# Patient Record
Sex: FEMALE | Race: WHITE | Employment: STUDENT | URBAN - METROPOLITAN AREA
[De-identification: names, ages, dates, MRNs, and addresses within clinical notes are randomized per-mention and may not be internally consistent; named-entity substitution may affect disease eponyms.]

---
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
## 2021-01-05 ENCOUNTER — LAB ENCOUNTER (OUTPATIENT)
Dept: LAB | Facility: HOSPITAL | Age: 21
End: 2021-01-05
Attending: FAMILY MEDICINE
Payer: COMMERCIAL

## 2021-01-05 DIAGNOSIS — N92.0 MENORRHAGIA WITH REGULAR CYCLE: ICD-10-CM

## 2021-01-05 DIAGNOSIS — R42 DIZZINESS: ICD-10-CM

## 2021-01-05 LAB
ALBUMIN SERPL-MCNC: 4.2 G/DL (ref 3.4–5)
ALBUMIN/GLOB SERPL: 1.5 {RATIO} (ref 1–2)
ALP LIVER SERPL-CCNC: 56 U/L
ALT SERPL-CCNC: 18 U/L
ANION GAP SERPL CALC-SCNC: 8 MMOL/L (ref 0–18)
AST SERPL-CCNC: 11 U/L (ref 15–37)
BILIRUB SERPL-MCNC: 0.5 MG/DL (ref 0.1–2)
BUN BLD-MCNC: 8 MG/DL (ref 7–18)
BUN/CREAT SERPL: 12.7 (ref 10–20)
CALCIUM BLD-MCNC: 9.2 MG/DL (ref 8.5–10.1)
CHLORIDE SERPL-SCNC: 105 MMOL/L (ref 98–112)
CO2 SERPL-SCNC: 25 MMOL/L (ref 21–32)
CREAT BLD-MCNC: 0.63 MG/DL
DEPRECATED HBV CORE AB SER IA-ACNC: 19.7 NG/ML
DEPRECATED RDW RBC AUTO: 39.8 FL (ref 35.1–46.3)
ERYTHROCYTE [DISTWIDTH] IN BLOOD BY AUTOMATED COUNT: 12 % (ref 11–15)
GLOBULIN PLAS-MCNC: 2.8 G/DL (ref 2.8–4.4)
GLUCOSE BLD-MCNC: 94 MG/DL (ref 70–99)
HCT VFR BLD AUTO: 38.2 %
HGB BLD-MCNC: 13.1 G/DL
IRON SATURATION: 26 %
IRON SERPL-MCNC: 105 UG/DL
M PROTEIN MFR SERPL ELPH: 7 G/DL (ref 6.4–8.2)
MCH RBC QN AUTO: 31 PG (ref 26–34)
MCHC RBC AUTO-ENTMCNC: 34.3 G/DL (ref 31–37)
MCV RBC AUTO: 90.5 FL
OSMOLALITY SERPL CALC.SUM OF ELEC: 284 MOSM/KG (ref 275–295)
PATIENT FASTING Y/N/NP: NO
PLATELET # BLD AUTO: 209 10(3)UL (ref 150–450)
POTASSIUM SERPL-SCNC: 4 MMOL/L (ref 3.5–5.1)
RBC # BLD AUTO: 4.22 X10(6)UL
SODIUM SERPL-SCNC: 138 MMOL/L (ref 136–145)
TOTAL IRON BINDING CAPACITY: 401 UG/DL (ref 240–450)
TRANSFERRIN SERPL-MCNC: 269 MG/DL (ref 200–360)
TSI SER-ACNC: 1.08 MIU/ML (ref 0.36–3.74)
VIT B12 SERPL-MCNC: 337 PG/ML (ref 193–986)
VIT D+METAB SERPL-MCNC: 28.6 NG/ML (ref 30–100)
WBC # BLD AUTO: 7 X10(3) UL (ref 4–11)

## 2021-01-05 PROCEDURE — 36415 COLL VENOUS BLD VENIPUNCTURE: CPT

## 2021-01-05 PROCEDURE — 83540 ASSAY OF IRON: CPT

## 2021-01-05 PROCEDURE — 83550 IRON BINDING TEST: CPT

## 2021-01-05 PROCEDURE — 82306 VITAMIN D 25 HYDROXY: CPT

## 2021-01-05 PROCEDURE — 80053 COMPREHEN METABOLIC PANEL: CPT

## 2021-01-05 PROCEDURE — 82728 ASSAY OF FERRITIN: CPT

## 2021-01-05 PROCEDURE — 84443 ASSAY THYROID STIM HORMONE: CPT

## 2021-01-05 PROCEDURE — 82607 VITAMIN B-12: CPT

## 2021-01-05 PROCEDURE — 85027 COMPLETE CBC AUTOMATED: CPT

## 2021-01-07 ENCOUNTER — TELEPHONE (OUTPATIENT)
Dept: FAMILY MEDICINE CLINIC | Facility: CLINIC | Age: 21
End: 2021-01-07

## 2021-01-07 NOTE — TELEPHONE ENCOUNTER
Pt's mother is calling they are anxious to hear the results for the labs and to make sure before she goes back to school she has everything she needs please call.

## 2021-04-02 NOTE — PROGRESS NOTES
VIDEO VISIT  This visit is conducted using Telemedicine with live, interactive video and audio. Patient has been referred to the [facility] website at [email or web address] to review the yearly Consent to Treat document.     Patient understands and accepts Social History    Tobacco Use      Smoking status: Never Smoker      Smokeless tobacco: Never Used    Vaping Use      Vaping Use: Never used    Alcohol use: No    Drug use: No       Current Outpatient Medications   Medication Sig Dispense Refill   • FLUo

## 2021-04-24 DIAGNOSIS — F41.1 GAD (GENERALIZED ANXIETY DISORDER): ICD-10-CM

## 2021-04-24 DIAGNOSIS — F41.0 PANIC DISORDER: ICD-10-CM

## 2021-04-26 RX ORDER — FLUOXETINE 10 MG/1
TABLET, FILM COATED ORAL
Qty: 30 TABLET | Refills: 1 | OUTPATIENT
Start: 2021-04-26

## 2021-05-21 ENCOUNTER — HOSPITAL ENCOUNTER (OUTPATIENT)
Age: 21
Discharge: HOME | End: 2021-05-21
Attending: EMERGENCY MEDICINE
Payer: COMMERCIAL

## 2021-05-21 VITALS
SYSTOLIC BLOOD PRESSURE: 105 MMHG | RESPIRATION RATE: 16 BRPM | DIASTOLIC BLOOD PRESSURE: 60 MMHG | HEART RATE: 68 BPM | TEMPERATURE: 99 F

## 2021-05-21 DIAGNOSIS — S61.210A LACERATION OF RIGHT INDEX FINGER WITHOUT FOREIGN BODY WITHOUT DAMAGE TO NAIL, INITIAL ENCOUNTER: Primary | ICD-10-CM

## 2021-05-21 PROCEDURE — 99213 OFFICE O/P EST LOW 20 MIN: CPT

## 2021-05-21 PROCEDURE — 12001 RPR S/N/AX/GEN/TRNK 2.5CM/<: CPT

## 2021-05-21 PROCEDURE — 99202 OFFICE O/P NEW SF 15 MIN: CPT

## 2021-05-22 NOTE — ED INITIAL ASSESSMENT (HPI)
Cut right hand on broken dish yesterday. States she continues to have bleeding with movement of hand. + distal CMS.

## 2021-05-22 NOTE — ED PROVIDER NOTES
Patient Seen in: [facility]      History   Patient presents with:  Laceration/Abrasion    Stated Complaint: cut on hand between fingers    HPI/Subjective:   HPI    The patient is a 41-year-old female with no significant past medical history p upper and lower extremities bilaterally  Extremities: No focal swelling or tenderness  Skin: There is a 1.5 cm full-thickness laceration on the dorsal aspect of the right hand overlying the right second MCP joint.       ED Course   Labs Reviewed - No data t

## 2021-05-31 ENCOUNTER — HOSPITAL ENCOUNTER (OUTPATIENT)
Age: 21
Discharge: HOME | End: 2021-05-31
Payer: COMMERCIAL

## 2021-05-31 VITALS
RESPIRATION RATE: 18 BRPM | HEART RATE: 78 BPM | DIASTOLIC BLOOD PRESSURE: 70 MMHG | BODY MASS INDEX: 19 KG/M2 | SYSTOLIC BLOOD PRESSURE: 124 MMHG | OXYGEN SATURATION: 99 % | WEIGHT: 110 LBS | TEMPERATURE: 98 F

## 2021-05-31 DIAGNOSIS — Z48.02 ENCOUNTER FOR REMOVAL OF SUTURES: Primary | ICD-10-CM

## 2021-05-31 NOTE — ED INITIAL ASSESSMENT (HPI)
PATIENT ARRIVED AMBULATORY TO ROOM WITH FATHER FOR SUTURE REMOVAL. PATIENT WAS IN THE IC ON 5/21 AND HAD ONE SUTURE PLACED TO THE RIGHT HAND. HERE FOR SUTURE REMOVAL. NO REDNESS/SWELLING TO SITE.

## 2021-05-31 NOTE — ED PROVIDER NOTES
Patient presents with:  Suture Removal      HPI:     Savita Stoddard is a 24year old female presents for suture removal removal. Injury occurred 9 days ago. The patient denies wound problems or concerns.      Family History   Problem Relation Age of Onset Transportation (Medical):       Lack of Transportation (Non-Medical):   Physical Activity:       Days of Exercise per Week:       Minutes of Exercise per Session:   Stress:       Feeling of Stress :   Social Connections:       Frequency of Communication wi • FASTING 01/05/2021 No    • WBC 01/05/2021 7.0    • RBC 01/05/2021 4.22    • HGB 01/05/2021 13.1    • HCT 01/05/2021 38.2    • PLT 01/05/2021 209.0    • MCV 01/05/2021 90.5    • MCH 01/05/2021 31.0    • MCHC 01/05/2021 34.3    • RDW 01/05/2021 12.0    •

## 2021-09-20 ENCOUNTER — TELEPHONE (OUTPATIENT)
Dept: FAMILY MEDICINE CLINIC | Facility: CLINIC | Age: 21
End: 2021-09-20

## 2021-09-20 DIAGNOSIS — F41.0 PANIC DISORDER: ICD-10-CM

## 2021-09-20 DIAGNOSIS — F41.1 GAD (GENERALIZED ANXIETY DISORDER): ICD-10-CM

## 2021-09-20 NOTE — TELEPHONE ENCOUNTER
Refill request for:    Requested Prescriptions      No prescriptions requested or ordered in this encounter        Last Prescribed Quantity Refills   4/2/2021 30 1   Telemedicine on 4/21/2021     Patient was asked to follow-up in: Follow-up timeframe not d

## 2021-09-21 DIAGNOSIS — F41.0 PANIC DISORDER: ICD-10-CM

## 2021-09-21 DIAGNOSIS — F41.1 GAD (GENERALIZED ANXIETY DISORDER): ICD-10-CM

## 2021-09-21 RX ORDER — FLUOXETINE 10 MG/1
TABLET, FILM COATED ORAL
Qty: 30 TABLET | Refills: 1 | Status: SHIPPED | OUTPATIENT
Start: 2021-09-21

## 2021-09-21 NOTE — TELEPHONE ENCOUNTER
Refill request for:    Requested Prescriptions     Pending Prescriptions Disp Refills   • FLUOXETINE 10 MG Oral Tab [Pharmacy Med Name: FLUOXETINE HCL 10 MG TABLET] 30 tablet 1     Sig: TAKE 1 TABLET BY MOUTH EVERY DAY        Last Prescribed Quantity Refil

## 2021-09-23 NOTE — TELEPHONE ENCOUNTER
Called patient and left message on machine to contact office and schedule follow up appointment, let her know refill was sent

## 2021-10-26 ENCOUNTER — TELEMEDICINE (OUTPATIENT)
Dept: FAMILY MEDICINE CLINIC | Facility: CLINIC | Age: 21
End: 2021-10-26

## 2021-10-26 DIAGNOSIS — N92.1 BREAKTHROUGH BLEEDING ON OCPS: Primary | ICD-10-CM

## 2021-10-26 PROCEDURE — 99213 OFFICE O/P EST LOW 20 MIN: CPT | Performed by: FAMILY MEDICINE

## 2021-10-26 NOTE — PROGRESS NOTES
VIDEO VISIT  This visit is conducted using Telemedicine with live, interactive video and audio. Patient has been referred to the [facility] website at [email or web address] to review the yearly Consent to Treat document.     Patient understands and accepts Brother    • Heart Disease Paternal Uncle       Social History    Tobacco Use      Smoking status: Never Smoker      Smokeless tobacco: Never Used    Vaping Use      Vaping Use: Never used    Alcohol use: No    Drug use: No       Current Outpatient Medicat

## 2021-11-13 DIAGNOSIS — N92.0 MENORRHAGIA WITH REGULAR CYCLE: ICD-10-CM

## 2021-11-18 RX ORDER — LEVONORGESTREL AND ETHINYL ESTRADIOL 0.1-0.02MG
KIT ORAL
Qty: 84 TABLET | Refills: 3 | Status: SHIPPED | OUTPATIENT
Start: 2021-11-18

## 2021-11-18 NOTE — TELEPHONE ENCOUNTER
Requested Prescriptions     Pending Prescriptions Disp Refills   • LARISSIA 0.1-20 MG-MCG Oral Tab [Pharmacy Med Name: LARISSIA-28 TABLET] 84 tablet 3     Sig: TAKE 1 TABLET BY MOUTH EVERY DAY     LOV 12/15/2020         Appointment scheduled: Visit date no

## 2022-04-14 ENCOUNTER — TELEPHONE (OUTPATIENT)
Dept: FAMILY MEDICINE CLINIC | Facility: CLINIC | Age: 22
End: 2022-04-14

## 2022-04-14 NOTE — TELEPHONE ENCOUNTER
Patient is calling to confirm the dosage of the Fluoxetine. On 9/21/21 it was prescribed as 1 tablet and she said she was taking 1/2 tablet can a nurse confirm the dosage.

## 2022-04-14 NOTE — TELEPHONE ENCOUNTER
Patient states she has been taking half tab of Fluoxetine, but chart indicates one tab. No notes indicating dosage change. Pt reports she feels fine on current dose. Will continue this and plan for f/u in office soon to discuss. She verbalized understanding and agrees with plan.

## 2022-08-08 ENCOUNTER — TELEPHONE (OUTPATIENT)
Dept: CASE MANAGEMENT | Age: 22
End: 2022-08-08

## 2022-08-09 ENCOUNTER — TELEPHONE (OUTPATIENT)
Dept: FAMILY MEDICINE CLINIC | Facility: CLINIC | Age: 22
End: 2022-08-09

## 2022-08-09 NOTE — TELEPHONE ENCOUNTER
Called pharmacy and they gave her vienva which is the same hormones as sonja so OK to substitute. Called mother and explained this to her she is OK with this.

## 2022-08-09 NOTE — TELEPHONE ENCOUNTER
Pt's mother is calling she said that [facility] is telling her that they can not get Margret and that Dr. Jean Carlos Hancock put in a different [facility] is what they are giving her instead. Mother is very concerned with the side affects she is going back to school and her daughter wants to know if it will cause weight gain, feeling sick and more. Please call to confirm with mother I did not even see a script requested for her birth control.

## 2022-08-16 ENCOUNTER — TELEPHONE (OUTPATIENT)
Dept: FAMILY MEDICINE CLINIC | Facility: CLINIC | Age: 22
End: 2022-08-16

## 2022-08-16 NOTE — TELEPHONE ENCOUNTER
LOV was telemedicine on 10/26/21. Please assist pt in scheduling appt to discuss \"eating disorder. \"    Routed to front staff.

## 2022-08-16 NOTE — TELEPHONE ENCOUNTER
Pt mom Maile calling on behalf of Pt. Pt is looking for a referral for a nutrictianist dietitian to help with eating disorder. Pt is aware mom is calling. Please advise mom.  Mom was informed Pt may need to be seen in order get referral.

## 2022-08-16 NOTE — TELEPHONE ENCOUNTER
Pt is leaving for school 8/27/22. Pt would like to be seen prior to leaving for school. Pt is trying to coordinate care with therapist and nutritionist so that she is on the right track while at school. Is it possible to get put in somewhere on Dr. Kevin King or Dr Thomas New schedule next week?

## 2022-08-16 NOTE — TELEPHONE ENCOUNTER
On Friday my 9am is inappropriately scheduled for 40 minutes. Can make 20 minutes, move 9:40 up to 9:20 slot and offer Zenobia the 9:40 slot.      Aviva Magaña, DO

## 2022-08-17 ENCOUNTER — PATIENT OUTREACH (OUTPATIENT)
Dept: CASE MANAGEMENT | Age: 22
End: 2022-08-17

## 2022-08-17 NOTE — PROGRESS NOTES
Call from member giving me permission to send list of providers to her mother's e-mail. Put together list and sent to patient's mother. Also spoke with her mother and informed her I was sending same.

## 2022-08-22 ENCOUNTER — TELEPHONE (OUTPATIENT)
Dept: CASE MANAGEMENT | Age: 22
End: 2022-08-22

## 2022-08-22 NOTE — TELEPHONE ENCOUNTER
Not quite sure what patient's mom is concerned about specifically, but I placed routine labs.    Dave Gains, DO

## 2022-08-22 NOTE — TELEPHONE ENCOUNTER
Pt mom is requesting labs be placed for Pt. States Pt will need baseline labs prior to going forward with specialists. Please advise mom. Pt leaves for college this Saturday and would like to get this taken care of before then.

## 2022-08-22 NOTE — TELEPHONE ENCOUNTER
Call from patient's mother. Had some concerns about getting therapist. Had told her last week to try Vincent Carls which she has called and is waiting for call back. Also asked about Tierra Morales at [place]. I told her she is in network. Had another person who [facility] [place] who does \"[employer]. \" She was OON and I told her chances are she will not get reimbursed because [facility] is not allowing any OON coverage. Also she had been told by [facility] to call me for Dietician. I explained that it comes out of medical. Told her to call them back and talk to nurse . Also, told her I know Chyna Mojica is in network but she should talk with [facility]. She called me later and said Kareen How can see patient tomorrow but needs a referral. I explained PCP would have to put it in. She appeared to understand.

## 2022-08-23 ENCOUNTER — LAB ENCOUNTER (OUTPATIENT)
Dept: LAB | Facility: HOSPITAL | Age: 22
End: 2022-08-23
Attending: FAMILY MEDICINE
Payer: COMMERCIAL

## 2022-08-23 DIAGNOSIS — F50.9 EATING DISORDER, UNSPECIFIED TYPE: ICD-10-CM

## 2022-08-23 LAB
ALBUMIN SERPL-MCNC: 3.8 G/DL (ref 3.4–5)
ALBUMIN/GLOB SERPL: 1.1 {RATIO} (ref 1–2)
ALP LIVER SERPL-CCNC: 55 U/L
ALT SERPL-CCNC: 19 U/L
ANION GAP SERPL CALC-SCNC: 5 MMOL/L (ref 0–18)
AST SERPL-CCNC: 18 U/L (ref 15–37)
BASOPHILS # BLD AUTO: 0.04 X10(3) UL (ref 0–0.2)
BASOPHILS NFR BLD AUTO: 0.7 %
BILIRUB SERPL-MCNC: 0.5 MG/DL (ref 0.1–2)
BUN BLD-MCNC: 9 MG/DL (ref 7–18)
CALCIUM BLD-MCNC: 9.1 MG/DL (ref 8.5–10.1)
CHLORIDE SERPL-SCNC: 107 MMOL/L (ref 98–112)
CO2 SERPL-SCNC: 25 MMOL/L (ref 21–32)
CREAT BLD-MCNC: 0.72 MG/DL
EOSINOPHIL # BLD AUTO: 0.06 X10(3) UL (ref 0–0.7)
EOSINOPHIL NFR BLD AUTO: 1 %
ERYTHROCYTE [DISTWIDTH] IN BLOOD BY AUTOMATED COUNT: 11.9 %
ERYTHROCYTE [SEDIMENTATION RATE] IN BLOOD: 9 MM/HR
GFR SERPLBLD BASED ON 1.73 SQ M-ARVRAT: 123 ML/MIN/1.73M2 (ref 60–?)
GLOBULIN PLAS-MCNC: 3.5 G/DL (ref 2.8–4.4)
GLUCOSE BLD-MCNC: 83 MG/DL (ref 70–99)
HCT VFR BLD AUTO: 42.1 %
HGB BLD-MCNC: 13.8 G/DL
IMM GRANULOCYTES # BLD AUTO: 0.01 X10(3) UL (ref 0–1)
IMM GRANULOCYTES NFR BLD: 0.2 %
LYMPHOCYTES # BLD AUTO: 2.26 X10(3) UL (ref 1–4)
LYMPHOCYTES NFR BLD AUTO: 38.3 %
MAGNESIUM SERPL-MCNC: 2.3 MG/DL (ref 1.6–2.6)
MCH RBC QN AUTO: 31.2 PG (ref 26–34)
MCHC RBC AUTO-ENTMCNC: 32.8 G/DL (ref 31–37)
MCV RBC AUTO: 95.2 FL
MONOCYTES # BLD AUTO: 0.25 X10(3) UL (ref 0.1–1)
MONOCYTES NFR BLD AUTO: 4.2 %
NEUTROPHILS # BLD AUTO: 3.28 X10 (3) UL (ref 1.5–7.7)
NEUTROPHILS # BLD AUTO: 3.28 X10(3) UL (ref 1.5–7.7)
NEUTROPHILS NFR BLD AUTO: 55.6 %
OSMOLALITY SERPL CALC.SUM OF ELEC: 282 MOSM/KG (ref 275–295)
PHOSPHATE SERPL-MCNC: 3.1 MG/DL (ref 2.5–4.9)
PLATELET # BLD AUTO: 208 10(3)UL (ref 150–450)
POTASSIUM SERPL-SCNC: 3.9 MMOL/L (ref 3.5–5.1)
PROT SERPL-MCNC: 7.3 G/DL (ref 6.4–8.2)
RBC # BLD AUTO: 4.42 X10(6)UL
SODIUM SERPL-SCNC: 137 MMOL/L (ref 136–145)
TSI SER-ACNC: 1.55 MIU/ML (ref 0.36–3.74)
WBC # BLD AUTO: 5.9 X10(3) UL (ref 4–11)

## 2022-08-23 PROCEDURE — 85025 COMPLETE CBC W/AUTO DIFF WBC: CPT

## 2022-08-23 PROCEDURE — 84443 ASSAY THYROID STIM HORMONE: CPT

## 2022-08-23 PROCEDURE — 36415 COLL VENOUS BLD VENIPUNCTURE: CPT

## 2022-08-23 PROCEDURE — 83735 ASSAY OF MAGNESIUM: CPT

## 2022-08-23 PROCEDURE — 85652 RBC SED RATE AUTOMATED: CPT

## 2022-08-23 PROCEDURE — 84100 ASSAY OF PHOSPHORUS: CPT

## 2022-08-23 PROCEDURE — 80053 COMPREHEN METABOLIC PANEL: CPT

## 2022-08-23 NOTE — TELEPHONE ENCOUNTER
TC from mother seeking referral for dietitian for her daughter who has eating disorder. Given name of Geri Alcantar RD by Dieter Li.

## 2022-08-25 ENCOUNTER — TELEPHONE (OUTPATIENT)
Dept: CASE MANAGEMENT | Age: 22
End: 2022-08-25

## 2022-09-08 ENCOUNTER — TELEPHONE (OUTPATIENT)
Dept: FAMILY MEDICINE CLINIC | Facility: CLINIC | Age: 22
End: 2022-09-08

## 2022-09-08 NOTE — TELEPHONE ENCOUNTER
Mom notified we were able to get extension on authorization for Ilan Harrison RD.   Referral faxed to Ilan Harrison, [address] at [phone]

## 2022-10-25 ENCOUNTER — TELEPHONE (OUTPATIENT)
Dept: FAMILY MEDICINE CLINIC | Facility: CLINIC | Age: 22
End: 2022-10-25

## 2022-10-25 ENCOUNTER — OFFICE VISIT (OUTPATIENT)
Dept: FAMILY MEDICINE CLINIC | Facility: CLINIC | Age: 22
End: 2022-10-25
Payer: COMMERCIAL

## 2022-10-25 VITALS
SYSTOLIC BLOOD PRESSURE: 118 MMHG | HEART RATE: 78 BPM | BODY MASS INDEX: 19.36 KG/M2 | WEIGHT: 113.38 LBS | HEIGHT: 64 IN | DIASTOLIC BLOOD PRESSURE: 70 MMHG | RESPIRATION RATE: 18 BRPM

## 2022-10-25 DIAGNOSIS — J02.9 SORE THROAT: Primary | ICD-10-CM

## 2022-10-25 PROCEDURE — 99213 OFFICE O/P EST LOW 20 MIN: CPT | Performed by: PHYSICIAN ASSISTANT

## 2022-10-25 PROCEDURE — 3074F SYST BP LT 130 MM HG: CPT | Performed by: PHYSICIAN ASSISTANT

## 2022-10-25 PROCEDURE — 3008F BODY MASS INDEX DOCD: CPT | Performed by: PHYSICIAN ASSISTANT

## 2022-10-25 PROCEDURE — 3078F DIAST BP <80 MM HG: CPT | Performed by: PHYSICIAN ASSISTANT

## 2022-10-25 NOTE — TELEPHONE ENCOUNTER
Called and talked to mother daughter was ill yesterday has been taking allergy medication steam shower and gargling with salt water. Not getting better. She is at school but mother will see if they can get her by 15:00 if that appointment is still open.  Made appointment for tomorrow with Krupa Little

## 2022-10-28 DIAGNOSIS — N92.0 MENORRHAGIA WITH REGULAR CYCLE: ICD-10-CM

## 2022-10-28 RX ORDER — LEVONORGESTREL AND ETHINYL ESTRADIOL 0.1-0.02MG
KIT ORAL
Qty: 84 TABLET | Refills: 0 | Status: SHIPPED | OUTPATIENT
Start: 2022-10-28

## 2023-01-16 DIAGNOSIS — N92.0 MENORRHAGIA WITH REGULAR CYCLE: ICD-10-CM

## 2023-01-17 RX ORDER — LEVONORGESTREL AND ETHINYL ESTRADIOL 0.1-0.02MG
KIT ORAL
Qty: 84 TABLET | Refills: 0 | Status: SHIPPED | OUTPATIENT
Start: 2023-01-17

## 2023-07-02 ENCOUNTER — TELEPHONE (OUTPATIENT)
Dept: FAMILY MEDICINE CLINIC | Facility: CLINIC | Age: 23
End: 2023-07-02

## 2023-07-02 DIAGNOSIS — N92.0 MENORRHAGIA WITH REGULAR CYCLE: ICD-10-CM

## 2023-07-03 NOTE — TELEPHONE ENCOUNTER
Refill request for:    Requested Prescriptions     Pending Prescriptions Disp Refills    VIENVA 0.1-20 MG-MCG Oral Tab [Pharmacy Med Name: Adria Kim TABLET] 84 tablet 0     Sig: TAKE 1 TABLET BY MOUTH EVERY DAY        Last Prescribed Quantity Refills   4/11/23 84 0     LOV 10/25/2022 was acute. Patient was asked to follow-up in: PRN    Appointment scheduled: Visit date not found    Medication fails protocol because pt has not had a pap. Please assist pt in scheduling Px and pap. Ask pt if she has enough medication to last until appt. Routed to front staff.

## 2023-07-13 RX ORDER — TIMOLOL MALEATE 5 MG/ML
SOLUTION/ DROPS OPHTHALMIC
Qty: 84 TABLET | Refills: 0 | Status: SHIPPED | OUTPATIENT
Start: 2023-07-13

## 2023-08-03 ENCOUNTER — OFFICE VISIT (OUTPATIENT)
Dept: FAMILY MEDICINE CLINIC | Facility: CLINIC | Age: 23
End: 2023-08-03
Payer: COMMERCIAL

## 2023-08-03 VITALS
OXYGEN SATURATION: 98 % | BODY MASS INDEX: 20.42 KG/M2 | RESPIRATION RATE: 16 BRPM | HEIGHT: 64.37 IN | DIASTOLIC BLOOD PRESSURE: 70 MMHG | SYSTOLIC BLOOD PRESSURE: 100 MMHG | HEART RATE: 78 BPM | TEMPERATURE: 97 F | WEIGHT: 119.63 LBS

## 2023-08-03 DIAGNOSIS — N92.0 MENORRHAGIA WITH REGULAR CYCLE: ICD-10-CM

## 2023-08-03 DIAGNOSIS — Z00.00 GENERAL MEDICAL EXAMINATION: Primary | ICD-10-CM

## 2023-08-03 DIAGNOSIS — F41.1 GAD (GENERALIZED ANXIETY DISORDER): ICD-10-CM

## 2023-08-03 PROCEDURE — 3074F SYST BP LT 130 MM HG: CPT | Performed by: PHYSICIAN ASSISTANT

## 2023-08-03 PROCEDURE — 3008F BODY MASS INDEX DOCD: CPT | Performed by: PHYSICIAN ASSISTANT

## 2023-08-03 PROCEDURE — 99395 PREV VISIT EST AGE 18-39: CPT | Performed by: PHYSICIAN ASSISTANT

## 2023-08-03 PROCEDURE — 3078F DIAST BP <80 MM HG: CPT | Performed by: PHYSICIAN ASSISTANT

## 2023-08-03 RX ORDER — FLUOXETINE 10 MG/1
10 CAPSULE ORAL DAILY
Qty: 90 CAPSULE | Refills: 0 | Status: SHIPPED | OUTPATIENT
Start: 2023-08-03

## 2023-08-03 RX ORDER — LEVONORGESTREL AND ETHINYL ESTRADIOL 0.1-0.02MG
1 KIT ORAL DAILY
Qty: 84 TABLET | Refills: 0 | Status: SHIPPED | OUTPATIENT
Start: 2023-08-03

## 2023-10-18 ENCOUNTER — TELEPHONE (OUTPATIENT)
Dept: FAMILY MEDICINE CLINIC | Facility: CLINIC | Age: 23
End: 2023-10-18

## 2023-10-18 NOTE — TELEPHONE ENCOUNTER
Called and talked to patient went over the immunizations she has had.  Then mailed a copy of the immunization record to patient at her

## 2023-10-18 NOTE — TELEPHONE ENCOUNTER
Patient is needing to know what vaccine she has already and what she is missing for school reason.  Please call patient

## 2023-10-20 ENCOUNTER — TELEPHONE (OUTPATIENT)
Dept: FAMILY MEDICINE CLINIC | Facility: CLINIC | Age: 23
End: 2023-10-20

## 2023-10-24 ENCOUNTER — NURSE ONLY (OUTPATIENT)
Dept: FAMILY MEDICINE CLINIC | Facility: CLINIC | Age: 23
End: 2023-10-24

## 2023-10-24 PROCEDURE — 90715 TDAP VACCINE 7 YRS/> IM: CPT | Performed by: PHYSICIAN ASSISTANT

## 2023-10-24 PROCEDURE — 90471 IMMUNIZATION ADMIN: CPT | Performed by: PHYSICIAN ASSISTANT

## 2023-11-07 RX ORDER — FLUOXETINE 10 MG/1
10 CAPSULE ORAL DAILY
Qty: 90 CAPSULE | Refills: 0 | Status: SHIPPED | OUTPATIENT
Start: 2023-11-07

## 2023-11-07 NOTE — TELEPHONE ENCOUNTER
Refill request for:    Requested Prescriptions     Pending Prescriptions Disp Refills    FLUOXETINE 10 MG Oral Cap [Pharmacy Med Name: FLUOXETINE HCL 10 MG CAPSULE] 90 capsule 0     Sig: TAKE 1 CAPSULE BY MOUTH EVERY DAY        Last Prescribed Quantity Refills   8/3/23 90 0     LOV 8/3/2023     Patient was asked to follow-up in:     Appointment due:     Appointment scheduled: Visit date not found    Medication not on protocol.      Routed to [facility]

## 2023-12-08 DIAGNOSIS — N92.0 MENORRHAGIA WITH REGULAR CYCLE: ICD-10-CM

## 2023-12-11 DIAGNOSIS — N92.0 MENORRHAGIA WITH REGULAR CYCLE: ICD-10-CM

## 2023-12-12 RX ORDER — TIMOLOL MALEATE 5 MG/ML
1 SOLUTION/ DROPS OPHTHALMIC DAILY
Qty: 84 TABLET | Refills: 0 | Status: SHIPPED | OUTPATIENT
Start: 2023-12-12

## 2023-12-12 NOTE — TELEPHONE ENCOUNTER
Requested Prescriptions     Pending Prescriptions Disp Refills    VIENVA 0.1-20 MG-MCG Oral Tab [Pharmacy Med Name: Sindy Bettencourt 84 tablet 0     Sig: TAKE 1 TABLET BY MOUTH EVERY DAY     LOV 8/3/2023     Patient was asked to follow-up in: Follow-up not documented in note    Appointment scheduled: Visit date not found     Medication refilled per protocol.

## 2023-12-15 RX ORDER — LEVONORGESTREL AND ETHINYL ESTRADIOL 0.1-0.02MG
1 KIT ORAL DAILY
Qty: 84 TABLET | Refills: 0 | OUTPATIENT
Start: 2023-12-15

## 2024-02-05 RX ORDER — FLUOXETINE 10 MG/1
10 CAPSULE ORAL DAILY
Qty: 90 CAPSULE | Refills: 0 | Status: SHIPPED | OUTPATIENT
Start: 2024-02-05

## 2024-02-05 NOTE — TELEPHONE ENCOUNTER
Refill request for:    Requested Prescriptions     Pending Prescriptions Disp Refills    FLUOXETINE 10 MG Oral Cap [Pharmacy Med Name: FLUOXETINE HCL 10 MG CAPSULE] 90 capsule 0     Sig: TAKE 1 CAPSULE BY MOUTH EVERY DAY        Last Prescribed Quantity Refills   11/7/23 90 0     LOV 8/3/2023     Patient was asked to follow-up in:     Appointment due:     Appointment scheduled: Visit date not found    Medication not on protocol.     Routed to Dr. Villegas

## 2024-02-05 NOTE — TELEPHONE ENCOUNTER
Nelly in basket- refill provided but is due for office visit. Will need visit prior to future refills. Thanks.

## 2024-03-02 DIAGNOSIS — N92.0 MENORRHAGIA WITH REGULAR CYCLE: ICD-10-CM

## 2024-03-05 RX ORDER — TIMOLOL MALEATE 5 MG/ML
1 SOLUTION/ DROPS OPHTHALMIC DAILY
Qty: 84 TABLET | Refills: 0 | Status: SHIPPED | OUTPATIENT
Start: 2024-03-05

## 2024-03-05 NOTE — TELEPHONE ENCOUNTER
Requested Prescriptions     Pending Prescriptions Disp Refills    VIENVA 0.1-20 MG-MCG Oral Tab [Pharmacy Med Name: VIENVA-28 TABLET] 84 tablet 0     Sig: TAKE 1 TABLET BY MOUTH EVERY DAY     LOV 8/3/2023     Patient was asked to follow-up in:     Appointment scheduled: Visit date not found     Medication refilled per protocol.

## 2024-05-06 NOTE — TELEPHONE ENCOUNTER
Refill request for:    Requested Prescriptions     Pending Prescriptions Disp Refills    FLUOXETINE 10 MG Oral Cap [Pharmacy Med Name: FLUOXETINE HCL 10 MG CAPSULE] 90 capsule 0     Sig: TAKE 1 CAPSULE BY MOUTH EVERY DAY        Last Prescribed Quantity Refills   02/05/2024 90 0     LOV 8/3/2023     Patient was asked to follow-up in: 4 months    Appointment due:     Appointment scheduled: Visit date not found    Medication not on protocol.     Routed to [facility] and to . Per [facility] note would like to see pt back on winter break.

## 2024-05-07 RX ORDER — FLUOXETINE 10 MG/1
10 CAPSULE ORAL DAILY
Qty: 30 CAPSULE | Refills: 0 | Status: SHIPPED | OUTPATIENT
Start: 2024-05-07

## 2024-05-24 DIAGNOSIS — N92.0 MENORRHAGIA WITH REGULAR CYCLE: ICD-10-CM

## 2024-05-24 RX ORDER — TIMOLOL MALEATE 5 MG/ML
1 SOLUTION/ DROPS OPHTHALMIC DAILY
Qty: 84 TABLET | Refills: 0 | Status: SHIPPED | OUTPATIENT
Start: 2024-05-24

## 2024-08-16 DIAGNOSIS — N92.0 MENORRHAGIA WITH REGULAR CYCLE: ICD-10-CM

## 2024-08-16 RX ORDER — TIMOLOL MALEATE 5 MG/ML
1 SOLUTION/ DROPS OPHTHALMIC DAILY
Qty: 84 TABLET | Refills: 0 | Status: SHIPPED | OUTPATIENT
Start: 2024-08-16

## 2024-11-07 ENCOUNTER — TELEPHONE (OUTPATIENT)
Dept: FAMILY MEDICINE CLINIC | Facility: CLINIC | Age: 24
End: 2024-11-07

## 2024-11-07 DIAGNOSIS — N92.0 MENORRHAGIA WITH REGULAR CYCLE: ICD-10-CM

## 2024-11-21 RX ORDER — TIMOLOL MALEATE 5 MG/ML
1 SOLUTION/ DROPS OPHTHALMIC DAILY
Qty: 28 TABLET | Refills: 0 | Status: SHIPPED | OUTPATIENT
Start: 2024-11-21

## 2024-12-05 ENCOUNTER — OFFICE VISIT (OUTPATIENT)
Dept: FAMILY MEDICINE CLINIC | Facility: CLINIC | Age: 24
End: 2024-12-05
Payer: COMMERCIAL

## 2024-12-05 VITALS
BODY MASS INDEX: 21.42 KG/M2 | RESPIRATION RATE: 20 BRPM | SYSTOLIC BLOOD PRESSURE: 120 MMHG | HEIGHT: 64.75 IN | TEMPERATURE: 99 F | WEIGHT: 127 LBS | DIASTOLIC BLOOD PRESSURE: 68 MMHG | HEART RATE: 72 BPM

## 2024-12-05 DIAGNOSIS — Z83.2 FAMILY HISTORY OF FACTOR V LEIDEN MUTATION: ICD-10-CM

## 2024-12-05 DIAGNOSIS — N92.0 MENORRHAGIA WITH REGULAR CYCLE: ICD-10-CM

## 2024-12-05 DIAGNOSIS — Z00.00 WELL ADULT EXAM: Primary | ICD-10-CM

## 2024-12-05 DIAGNOSIS — Z12.4 SCREENING FOR CERVICAL CANCER: ICD-10-CM

## 2024-12-05 PROCEDURE — 88175 CYTOPATH C/V AUTO FLUID REDO: CPT | Performed by: PHYSICIAN ASSISTANT

## 2024-12-05 PROCEDURE — 99395 PREV VISIT EST AGE 18-39: CPT | Performed by: PHYSICIAN ASSISTANT

## 2024-12-05 PROCEDURE — 3078F DIAST BP <80 MM HG: CPT | Performed by: PHYSICIAN ASSISTANT

## 2024-12-05 PROCEDURE — 3074F SYST BP LT 130 MM HG: CPT | Performed by: PHYSICIAN ASSISTANT

## 2024-12-05 PROCEDURE — 3008F BODY MASS INDEX DOCD: CPT | Performed by: PHYSICIAN ASSISTANT

## 2024-12-05 RX ORDER — LEVONORGESTREL/ETHIN.ESTRADIOL 0.1-0.02MG
1 TABLET ORAL DAILY
Qty: 28 TABLET | Refills: 0 | Status: SHIPPED | OUTPATIENT
Start: 2024-12-05 | End: 2024-12-10

## 2024-12-05 RX ORDER — FLUOXETINE 10 MG/1
10 CAPSULE ORAL DAILY
Qty: 90 CAPSULE | Refills: 3 | Status: SHIPPED | OUTPATIENT
Start: 2024-12-05

## 2024-12-05 NOTE — PROGRESS NOTES
DATE OF EXAM  12/5/2024    CHIEF COMPLAINT/REASON FOR VISIT  Annual exam.    HISTORY OF PRESENT ILLNESS  Zenobia Gregory presents today for routine annual physical examination.      - Mom, maternal uncle, female cousin- all have factor V leiden  - For now on OCP. Taking as prescribed.   - Feels that fluoxetine working well for her. Would like to stay on the same dose.     Medications Ordered Prior to Encounter[1]    Patient has no known allergies.     Patient Active Problem List   Diagnosis    Epigastric pain       Past Surgical History:   Procedure Laterality Date    Other      WISDOM TEETH    Upper gi endoscopy,biopsy      mild acid reflux       Social History     Socioeconomic History    Marital status: OTHER   Occupational History    Occupation: student     Comment: [employer]   Tobacco Use    Smoking status: Never    Smokeless tobacco: Never   Vaping Use    Vaping status: Never Used   Substance and Sexual Activity    Alcohol use: Yes     Comment: 2 drinks a month    Drug use: No    Sexual activity: Never   Other Topics Concern    Caffeine Concern Yes     Comment: one serving a day    Sleep Concern No    Exercise Yes     Comment: one day a week    Seat Belt Yes       Family History   Problem Relation Age of Onset    No Known Problems Mother     Other (High Blood Pressure, acid Reflux) Father     Other (bladder cancer) Maternal Grandfather     Heart Disease Paternal Grandfather     Crohn's Disease Brother     Heart Disease Paternal Uncle        Health maintenance:  Health Maintenance   Topic Date Due    Chlamydia Screening  Never done    HPV Vaccines (3 - 3-dose series) 03/31/2020    Pap Smear  Never done    Annual Physical  08/03/2024    COVID-19 Vaccine (3 - 2024-25 season) 09/01/2024    Influenza Vaccine (1) 10/01/2024    DTaP,Tdap,and Td Vaccines (7 - Td or Tdap) 10/24/2033    Annual Depression Screening  Completed    Pneumococcal Vaccine: Birth to 64yrs  Aged Out         PHYSICAL EXAMINATION  Blood  pressure 120/68, pulse 72, temperature 98.7 °F (37.1 °C), temperature source Oral, resp. rate 20, height 5' 4.75\" (1.645 m), weight 127 lb (57.6 kg), last menstrual period 11/09/2024, not currently breastfeeding.   Body mass index is 21.3 kg/m².    GENERAL: Well-nourished, well-developed 24 year old year old in no apparent distress.  Awake, alert, age appropriate.  SKIN:  Warm, pink, and dry.  No rashes, excoriations, open areas.  HEENT: Head is normocephalic, atraumatic.  Bilateral pupils are equal, reactive to light, and accommodation. EOMs intact. Conjunctivae and sclera are clear.  Bilateral external ears nontender to manipulation with clear canals. Bilateral TMs normal.    Hearing is grossly normal.  Nares patent with normal mucosa.   Oropharynx pink and moist without exudate or erythema.  NECK: Supple without lymphadenopathy.  BREASTS: Breasts are symmetrical. No obvious dimpling. Breast tissue is soft. No palpable masses. Axillary region free of any masses or lymphadenopathy. No nipple discharge.  CARDIOVASCULAR: Regular rate and rhythm with no murmurs, rubs, or gallops.  LUNGS: Normal effort on room air.  Clear to auscultation throughout.  ABDOMEN:  Bowel sounds present throughout. Rounded, soft, without tenderness to palpation with no organomegaly.  PELVIC:  Normal female external genitalia without lesion or exudate, normal cervix without exudate, normal vaginal mucosa.  Bimanual examination without mass, or adnexa tenderness, no cervical motion tenderness.  MUSCULOSKELETAL: Normal active range of motion in all extremities. 5/5 strength in upper and lower extremities, equal bilaterally.   NEUROLOGIC: Alert and oriented x 3.   PSYCH: Mood and affect normal.    IMPRESSION/REPORT/PLAN    1.Routine physical examination:  Patient is doing well overall. Discussed age appropriate health topics including: regular exercise, balanced diet, sunscreen, monitoring moles, adequate calcium and vitamin D intake. Health  maintenance is up to date as shown above.  Recommend complete physical exams every year.    2. Menorrhagia with regular cycle  Will check factor V as this would be CI.   - Levonorgestrel-Ethinyl Estrad (VIENVA) 0.1-20 MG-MCG Oral Tab; Take 1 tablet by mouth daily.  Dispense: 28 tablet; Refill: 0    3. Screening for cervical cancer  Pap obtained, repeat in 3 years.   - Thin Prep Pap every 3 years with Cytology Alone; Future  - Thin Prep Pap every 3 years with Cytology Alone  - Image-Guided Pap Smear ([facility])    4. Family history of factor V Leiden mutation  Recommend screening for this.  - Factor V Leiden Mutation; Future        Patient expresses understanding and agreement with the above plan.   Chandan Pulido PA-C         [1]   Current Outpatient Medications on File Prior to Visit   Medication Sig Dispense Refill    [DISCONTINUED] Levonorgestrel-Ethinyl Estrad (VIENVA) 0.1-20 MG-MCG Oral Tab TAKE 1 TABLET BY MOUTH EVERY DAY 28 tablet 0     No current facility-administered medications on file prior to visit.

## 2024-12-10 RX ORDER — LEVONORGESTREL/ETHIN.ESTRADIOL 0.1-0.02MG
1 TABLET ORAL DAILY
Qty: 84 TABLET | Refills: 3 | Status: SHIPPED | OUTPATIENT
Start: 2024-12-10

## 2024-12-12 LAB
.: NORMAL
.: NORMAL

## 2024-12-19 ENCOUNTER — LAB ENCOUNTER (OUTPATIENT)
Dept: LAB | Age: 24
End: 2024-12-19
Attending: PHYSICIAN ASSISTANT
Payer: COMMERCIAL

## 2024-12-19 DIAGNOSIS — Z83.2 FAMILY HISTORY OF FACTOR V LEIDEN MUTATION: ICD-10-CM

## 2024-12-19 PROCEDURE — 36415 COLL VENOUS BLD VENIPUNCTURE: CPT

## 2024-12-19 PROCEDURE — 81241 F5 GENE: CPT

## 2024-12-21 PROBLEM — D68.51 HETEROZYGOUS FACTOR V LEIDEN MUTATION (HCC): Status: ACTIVE | Noted: 2024-12-21

## 2025-03-27 ENCOUNTER — OFFICE VISIT (OUTPATIENT)
Dept: FAMILY MEDICINE CLINIC | Facility: CLINIC | Age: 25
End: 2025-03-27
Payer: COMMERCIAL

## 2025-03-27 VITALS
DIASTOLIC BLOOD PRESSURE: 78 MMHG | RESPIRATION RATE: 14 BRPM | WEIGHT: 134 LBS | TEMPERATURE: 98 F | OXYGEN SATURATION: 97 % | SYSTOLIC BLOOD PRESSURE: 102 MMHG | BODY MASS INDEX: 22 KG/M2 | HEART RATE: 74 BPM

## 2025-03-27 DIAGNOSIS — M54.50 ACUTE MIDLINE LOW BACK PAIN WITHOUT SCIATICA: Primary | ICD-10-CM

## 2025-03-27 PROCEDURE — 3074F SYST BP LT 130 MM HG: CPT | Performed by: PHYSICIAN ASSISTANT

## 2025-03-27 PROCEDURE — 99213 OFFICE O/P EST LOW 20 MIN: CPT | Performed by: PHYSICIAN ASSISTANT

## 2025-03-27 PROCEDURE — 3078F DIAST BP <80 MM HG: CPT | Performed by: PHYSICIAN ASSISTANT

## 2025-03-27 NOTE — PROGRESS NOTES
The following individual(s) verbally consented to be recorded using ambient AI listening technology and understand that they can each withdraw their consent to this listening technology at any point by asking the clinician to turn off or pause the recording:    Patient name: Zenobia Gregory

## 2025-03-27 NOTE — PROGRESS NOTES
Subjective:   Zenobia Gregory is a 24 year old female who presents for Low Back Pain (Pt C/O lower back pain for the past month)       History/Other:   History of Present Illness  Zenobia Gregory is a 24 year old female who presents with back pain.    She has been experiencing sharp, localized back pain for the past month, consistently in the same spot on her spine. The pain worsens when sitting and improves when standing or lying down. It is also exacerbated by sneezing. No recent injuries, heavy lifting, or falls, but she mentions standing for long periods at her job at [employer] and frequent bending. She has a history of ballet, which she practiced daily until age 19, and has had previous local leg therapy for hip tendonitis. She recalls a past lower back injury involving a disc issue at age 16. No night sweats, numbness, tingling, or changes in walking. No family history of bone density problems. No appetite or weight changes and she feels otherwise healthy. No numbness, tingling, weakness.     Chief Complaint Reviewed and Verified  Nursing Notes Reviewed and   Verified  Tobacco Reviewed  Allergies Reviewed  Medications Reviewed    Problem List Reviewed  Medical History Reviewed  Surgical History   Reviewed  OB Status Reviewed  Family History Reviewed         Tobacco:  She has never smoked tobacco.    Current Outpatient Medications   Medication Sig Dispense Refill    Norethindrone (LIU) 0.35 MG Oral Tab Take 1 tablet (0.35 mg total) by mouth daily. 84 tablet 1    FLUoxetine 10 MG Oral Cap Take 1 capsule (10 mg total) by mouth daily. 90 capsule 3         Objective:   /78   Pulse 74   Temp 97.8 °F (36.6 °C)   Resp 14   Wt 134 lb (60.8 kg)   LMP 03/24/2025 (Exact Date)   SpO2 97%   BMI 22.47 kg/m²  Estimated body mass index is 22.47 kg/m² as calculated from the following:    Height as of 12/5/24: 5' 4.75\" (1.645 m).    Weight as of this encounter: 134 lb (60.8 kg).  Results       Physical  Exam  GENERAL: Alert, cooperative, well developed, no acute distress  HEENT: Normocephalic, normal oropharynx, moist mucous membranes  CHEST: Clear to auscultation bilaterally, no wheezes, rhonchi, or crackles  CARDIOVASCULAR: Normal heart rate and rhythm, S1 and S2 normal without murmurs  ABDOMEN: Soft, non-tender, non-distended, without organomegaly, normal bowel sounds  EXTREMITIES: No cyanosis or edema  MUSCULOSKELETAL: Spine normal range of motion. Tender midline lumbar spine. Right hip pain on abduction, left hip less pain on abduction than right.  NEUROLOGICAL: Cranial nerves grossly intact, moves all extremities without gross motor or sensory deficit      Assessment & Plan:   1. Acute midline low back pain without sciatica (Primary)  -     XR LUMBAR SPINE (MIN 4 VIEWS) (CPT=72110); Future; Expected date: 03/27/2025    Assessment & Plan  Low back pain  She reports sharp, localized low back pain for the past month, exacerbated by sitting and alleviated by standing or lying down. No associated numbness, tingling, or gait changes. Her ballet background may contribute to musculoskeletal issues. Concern exists for potential bony abnormalities given point tenderness and symptom duration.  - Order lumbar x-ray  - Supportive cares for now. Supprotive shoes. Avoid aggravating movements.   - Will let her know once I see results.      SHEILA Huynh, 3/27/2025, 1:44 PM

## 2025-04-03 ENCOUNTER — HOSPITAL ENCOUNTER (OUTPATIENT)
Dept: GENERAL RADIOLOGY | Age: 25
Discharge: HOME | End: 2025-04-03
Attending: PHYSICIAN ASSISTANT
Payer: COMMERCIAL

## 2025-04-03 DIAGNOSIS — M54.50 ACUTE MIDLINE LOW BACK PAIN WITHOUT SCIATICA: ICD-10-CM

## 2025-04-03 PROCEDURE — 72110 X-RAY EXAM L-2 SPINE 4/>VWS: CPT | Performed by: PHYSICIAN ASSISTANT

## 2025-04-14 ENCOUNTER — TELEPHONE (OUTPATIENT)
Dept: PHYSICAL THERAPY | Age: 25
End: 2025-04-14

## 2025-04-16 ENCOUNTER — PATIENT MESSAGE (OUTPATIENT)
Dept: FAMILY MEDICINE CLINIC | Facility: CLINIC | Age: 25
End: 2025-04-16

## 2025-04-17 ENCOUNTER — TELEPHONE (OUTPATIENT)
Dept: FAMILY MEDICINE CLINIC | Facility: CLINIC | Age: 25
End: 2025-04-17

## 2025-04-17 NOTE — TELEPHONE ENCOUNTER
Chandan Pulido stated form needed to go to Forms department. Given to front office to send to Forms department.

## 2025-04-17 NOTE — TELEPHONE ENCOUNTER
Received FMLA paperwork from a My chart message from [employer].  Pt had completed it and attached it to a message.  Chandan Pulido PA-C has requested it to go to forms completion. Paperwork scanned and sent to Forms completion, original sent inter office to Forms Dept as well.  [facility] 3. [phone] or fax [phone]

## 2025-04-25 NOTE — TELEPHONE ENCOUNTER
Family Medical Leave Act forms rec'd via email with incomplete Release of Information and missing pages. Sent [employer] message for Release of Information. Logged for processing.

## 2025-04-28 NOTE — TELEPHONE ENCOUNTER
Called patient to obtain details and to inform forms are missing pages. Left voicemail to call back [phone].

## 2025-04-29 NOTE — TELEPHONE ENCOUNTER
Attempted to call patient again to inform of missing pages and obtain details. Left voicemail to callback [phone]. Placed forms in [facility] ON HOLD.

## 2025-05-04 ENCOUNTER — TELEPHONE (OUTPATIENT)
Dept: PHYSICAL THERAPY | Facility: HOSPITAL | Age: 25
End: 2025-05-04

## 2025-05-05 ENCOUNTER — APPOINTMENT (OUTPATIENT)
Dept: PHYSICAL THERAPY | Facility: HOSPITAL | Age: 25
End: 2025-05-05
Attending: PHYSICIAN ASSISTANT
Payer: COMMERCIAL

## 2025-05-05 ENCOUNTER — APPOINTMENT (OUTPATIENT)
Dept: PHYSICAL THERAPY | Age: 25
End: 2025-05-05
Attending: PHYSICIAN ASSISTANT
Payer: COMMERCIAL

## 2025-05-05 ENCOUNTER — TELEPHONE (OUTPATIENT)
Dept: PHYSICAL THERAPY | Facility: HOSPITAL | Age: 25
End: 2025-05-05

## 2025-05-05 DIAGNOSIS — M54.50 ACUTE MIDLINE LOW BACK PAIN WITHOUT SCIATICA: Primary | ICD-10-CM

## 2025-05-05 PROCEDURE — 97161 PT EVAL LOW COMPLEX 20 MIN: CPT

## 2025-05-05 PROCEDURE — 97110 THERAPEUTIC EXERCISES: CPT

## 2025-05-05 NOTE — PROGRESS NOTES
SPINE EVALUATION:     Diagnosis:   Acute midline low back pain without sciatica Patient:  Zenobia Gregory (24 year old, female)        Referring Provider: Chandan Pulido  Today's Date   5/5/2025    Precautions:  None   Date of Evaluation: 05/05/25  Next MD visit: No data recorded  Date of Surgery: No data recorded     PATIENT SUMMARY   Summary of chief complaints: low back pain  History of current condition: 2 month history of low back area, no previous history, no traumatic event.   Pain level: current 6 /10, at best 3 /10 (with over the counter Tylenol), at worst 8 /10  Description of symptoms: constant sharp pain felt on LB area   Occupation:     Prior level of function: Independent  Current limitations: sitting, driving, sneezing  Pt goals: relieve pain, return to prior level of function  Red flag signs/symptoms: Pt denies changes in bowel/bladder function, saddle anesthesia    Past medical history was reviewed with Zenobia.  Significant findings include: NA  Imaging/Tests: XR: No fracture.  Mild postural changes.  Minimal narrowing of L3-4.   Zenobia  has no past medical history on file.  She  has a past surgical history that includes other and upper gi endoscopy,biopsy.    ASSESSMENT  Zenobia presents to physical therapy evaluation with primary c/o low back pain. The results of the objective tests and measures show painful and limited trunk motion, decreased core activation/strength, impaired posture. Reported decrease in pain level at end of visit.. Functional deficits include but are not limited to sitting, driving, sneezing. Signs and symptoms are consistent with diagnosis of Acute midline low back pain without sciatica. Pt and PT discussed evaluation findings, pathology, POC and HEP.  Pt voiced understanding and performs HEP correctly without reported pain. Skilled Physical Therapy is medically necessary to address the above impairments and reach functional goals.    OBJECTIVE:     Musculoskeletal:  Observation/Posture:   Pelvis is tilted anteriorly  Accessory Motion:   Substitutes thoracic movement for lumbar movement  Palpation:   Moderate tenderness over LS area    Special tests: (-) Slump test, SLR test        ROM and Strength:  (* denotes performed with pain)  Trunk ROM MMT (-/5)     Flex 35 -- (2-/5 all planes of motion)     Ext 25      R L R L     Side bend 12 15         Rotation 100% 100%       ,   Hip   ROM MMT (-/5)    R L R L     Flex (L2) -- (BLE are WNL and painfree, strength at 4/5)                Neurological:  Sensation:     Intact    Balance and Functional Mobility:  Gait: pt ambulates on level ground with  .  Balance: SLS: EO R  > 30 sec , EO L  >30 sec          Today's Treatment and Response:   Pt education was provided on exam findings, treatment diagnosis, treatment plan, expectations, and prognosis.  Today's Treatment       5/5/2025   Spine Treatment   Therapeutic Exercise On Wedge: Posterior pelvic tilts  Towel assisted SKTC  Slouch correction on dynadisc   Manual Therapy STM to LB, PA mobs to lumbar spine, sacral distractions   Modalities CP to LB at end of visit   Therapeutic Exercise Minutes 15   Evaluation Minutes 30   Hot/Cold Pack Minutes 5   Total Time Of Timed Procedures 15   Total Time Of Service-Based Procedures 35   Total Treatment Time 50   HEP Access Code: [email or web address]  URL: [email or web address]  Date: 05/05/2025  Prepared by: Vianey Crenshaw    Exercises  - Slouch Overcorrect on Swiss Ball  - 3 x daily - 7 x weekly - 1 sets - 10 reps        Patient was instructed in and issued a HEP for: Access Code: [email or web address]  URL: [email or web address]  Date: 05/05/2025  Prepared by: Vianey Crenshaw    Exercises  - Slouch Overcorrect on Swiss Ball  - 3 x daily - 7 x weekly - 1 sets - 10 reps    Charges:  PT EVAL:  , 1EVAL 1TE  In agreement with evaluation findings and clinical rationale, this evaluation involved LOW COMPLEXITY decision  making due to no personal factors/comorbidities, 1-2 body structures involved/activity limitations, and stable symptoms as documented in the evaluation.                                                                         PLAN OF CARE:    Goals: (to be met in 10 visits)    Not Met Progress Toward Partially Met Met   Pt will improve transversus abdominis recruitment to perform proper isometric contraction without requiring verbal or tactile cuing to promote advancement of therex. [] [] [] []   Pt will demonstrate good understanding of proper posture and body mechanics to decrease pain and improve spinal safety. [] [] [] []   Pt will improve lumbar spine AROM flexion to >60 deg to allow increase ease with bending forward to don shoes. [] [] [] []   Pt will  tolerate sitting and driving with pain no greater than 2-3/10 [] [] [] []   Pt will demonstrate improved core strength to be able to perform lifting tasks with <2/10 pain. [] [] [] []   Pt will be independent and compliant with comprehensive HEP to maintain progress achieved in PT [] [] [] []         Frequency / Duration: Patient will be seen 2x/week or a total of 10  visits over a 90 day period. Treatment will include: Manual Therapy; Therapeutic Exercise; Home Exercise Program instruction    Education or treatment limitation: None   Rehab Potential: good     Oswestry Disability Index Score  TBA next visit      Patient/Family/Caregiver was advised of these findings, precautions, and treatment options and has agreed to actively participate in planning and for this course of care.    Thank you for your referral. Please co-sign or sign and return this letter via fax as soon as possible to [phone]. If you have any questions, please contact me at Dept: [phone]    Sincerely,  Electronically signed by therapist: Vianey Crenshaw, PT  Physician's certification required: Yes  I certify the need for these services furnished under this plan of treatment and while  under my care.    X___________________________________________________ Date____________________    Certification From: 5/5/2025  To:8/3/2025

## 2025-05-07 ENCOUNTER — OFFICE VISIT (OUTPATIENT)
Dept: PHYSICAL THERAPY | Facility: HOSPITAL | Age: 25
End: 2025-05-07
Attending: PHYSICIAN ASSISTANT
Payer: COMMERCIAL

## 2025-05-07 ENCOUNTER — APPOINTMENT (OUTPATIENT)
Dept: PHYSICAL THERAPY | Age: 25
End: 2025-05-07
Attending: PHYSICIAN ASSISTANT
Payer: COMMERCIAL

## 2025-05-07 PROCEDURE — 97110 THERAPEUTIC EXERCISES: CPT

## 2025-05-07 PROCEDURE — 97140 MANUAL THERAPY 1/> REGIONS: CPT

## 2025-05-07 NOTE — PROGRESS NOTES
Patient: Zenobia Gregory (24 year old, female) Referring Provider:  Insurance:   Diagnosis: Acute midline low back pain without sciatica Chandan Pulido  [employer] [place] [employer]   Date of Surgery: No data recorded Next MD visit:  N/A   Precautions:  None No data recorded Referral Information:    Date of Evaluation: Req: 5, Auth: 5, Exp: 7/31/2025 05/05/25 POC Auth Visits:          Today's Date   5/7/2025    Subjective  Pt reports decrease in LB pain, has increased postural awareness and states she either slumps or arches her back. Sitting can still be uncomfortable.       Pain: 5/10     Objective  Tight back muscles so that bending forward is limited. Pt needed verbal and tactile cues to active core muscles. See flow            Assessment  At end of treatment  pt reported a decrease in pain . She also demonstrated core activation with cues and able to demonstrate good back mechanics. Continue working towards set goals.    Goals (to be met in 10 visits)      Not Met Progress Toward Partially Met Met   Pt will improve transversus abdominis recruitment to perform proper isometric contraction without requiring verbal or tactile cuing to promote advancement of therex. [] [] [] []   Pt will demonstrate good understanding of proper posture and body mechanics to decrease pain and improve spinal safety. [] [] [] []   Pt will improve lumbar spine AROM flexion to >60 deg to allow increase ease with bending forward to don shoes. [] [] [] []   Pt will  tolerate sitting and driving with pain no greater than 2-3/10 [] [] [] []   Pt will demonstrate improved core strength to be able to perform lifting tasks with <2/10 pain. [] [] [] []   Pt will be independent and compliant with comprehensive HEP to maintain progress achieved in PT [] [] [] []             Plan  Continue PT per poc    Treatment Last 4 Visits  Treatment Day: 2       5/5/2025 5/7/2025   Spine Treatment   Therapeutic Exercise On Wedge: Posterior pelvic tilts  Towel assisted  SKTC  Slouch correction on dynadisc NICHO with thoraci overpressure x 3  Cat/cow exercises into child's pose, quadruped arm raises, quadruped lateral pelvic tilts  On wedge in H/L: posterior pelvic tilts, towel assisted SKTC stretches  H/L LTR stretches ff by anti rotation trunk isometrics against red band 10 reps ea side  Resisted hip flexion exercises  Seated diagonal chops with theraball  Seated on dynadisc: posterior and lateral pelvic tilts  Educated on back mechanics, bending LE's, avoid twisting and she verbalized understanding   Manual Therapy STM to LB, PA mobs to lumbar spine, sacral distractions Gentle Gr 2 PA mobs to thoracic and lumbar spine, gentle side glides       Modalities CP to LB at end of visit CP x 5 minutes at end of visit   Therapeutic Exercise Minutes 15 27   Manual Therapy Minutes  15   Evaluation Minutes 30    Hot/Cold Pack Minutes 5 5   Total Time Of Timed Procedures 15 42   Total Time Of Service-Based Procedures 35 5   Total Treatment Time 50 47   HEP Access Code: [email or web address]  URL: [email or web address]  Date: 05/05/2025  Prepared by: Vianey Crenshaw    Exercises  - Slouch Overcorrect on Swiss Ball  - 3 x daily - 7 x weekly - 1 sets - 10 reps Access Code: [email or web address]  URL: [email or web address]  Date: 05/07/2025  Prepared by: Vianey Grantacruz    Exercises  - Cat Cow to Child's Pose  - 1 x daily - 7 x weekly - 1 sets - 10 reps  - Quadruped Alternating Arm Lift  - 1 x daily - 7 x weekly - 1 sets - 10 reps  - Hooklying Isometric Hip Flexion with Opposite Arm  - 1 x daily - 7 x weekly - 1 sets - 10 reps - 5 hold  - Dead Bug  - 1 x daily - 7 x weekly - 1 sets - 10 reps        HEP  Access Code: [email or web address]  URL: [email or web address]  Date: 05/07/2025  Prepared by: Vianey Crenshaw    Exercises  - Cat Cow to Child's Pose  - 1 x daily - 7 x weekly - 1 sets - 10 reps  - Quadruped Alternating Arm Lift  - 1 x daily - 7 x weekly - 1 sets - 10 reps  -  Hooklying Isometric Hip Flexion with Opposite Arm  - 1 x daily - 7 x weekly - 1 sets - 10 reps - 5 hold  - Dead Bug  - 1 x daily - 7 x weekly - 1 sets - 10 reps    Charges  1MT 2TE

## 2025-05-12 ENCOUNTER — APPOINTMENT (OUTPATIENT)
Dept: PHYSICAL THERAPY | Age: 25
End: 2025-05-12
Attending: PHYSICIAN ASSISTANT
Payer: COMMERCIAL

## 2025-05-15 ENCOUNTER — APPOINTMENT (OUTPATIENT)
Dept: PHYSICAL THERAPY | Age: 25
End: 2025-05-15
Attending: PHYSICIAN ASSISTANT
Payer: COMMERCIAL

## 2025-05-15 ENCOUNTER — OFFICE VISIT (OUTPATIENT)
Dept: PHYSICAL THERAPY | Facility: HOSPITAL | Age: 25
End: 2025-05-15
Attending: PHYSICIAN ASSISTANT
Payer: COMMERCIAL

## 2025-05-15 PROCEDURE — 97140 MANUAL THERAPY 1/> REGIONS: CPT

## 2025-05-15 PROCEDURE — 97014 ELECTRIC STIMULATION THERAPY: CPT

## 2025-05-15 PROCEDURE — 97110 THERAPEUTIC EXERCISES: CPT

## 2025-05-15 NOTE — PROGRESS NOTES
Patient: Zenobia Gregory (24 year old, female) Referring Provider:  Insurance:   Diagnosis: Acute midline low back pain without sciatica [facility]   Date of Surgery: No data recorded Next MD visit:  N/A   Precautions:  None No data recorded Referral Information:    Date of Evaluation: Req: 5, Auth: 5, Exp: 7/31/2025 05/05/25 POC Auth Visits:          Today's Date   5/15/2025    Subjective  Pt states that last week she sneezed and felt severe pain on LB. She denies any numbness.tingling on LE's . She iced and took over the counter medication and feels better but very stiff.       Pain: 5/10     Objective  Guarded and slow movements. Moderate pain with back and R hip stretches. Pt anticipates pain and movments were very guarded so that stretches were limited. Instructed on log rolling to get in and out of bed.            Assessment  Treatment modified today due to muscle guarding in anticipation of pain/ LB pain. Added IFC modality today and she verbalized feeling better at end of treatment. Instructed pt to continue ice and gentle stretches at home. She verbalized understanding.    Goals (to be met in 10 visits)      Not Met Progress Toward Partially Met Met   Pt will improve transversus abdominis recruitment to perform proper isometric contraction without requiring verbal or tactile cuing to promote advancement of therex. [] [] [] []   Pt will demonstrate good understanding of proper posture and body mechanics to decrease pain and improve spinal safety. [] [] [] []   Pt will improve lumbar spine AROM flexion to >60 deg to allow increase ease with bending forward to don shoes. [] [] [] []   Pt will  tolerate sitting and driving with pain no greater than 2-3/10 [] [] [] []   Pt will demonstrate improved core strength to be able to perform lifting tasks with <2/10 pain. [] [] [] []   Pt will be independent and compliant with comprehensive HEP to maintain progress achieved in PT [] [] [] []                  Plan  Continue PT per poc    Treatment Last 4 Visits  Treatment Day: 3       5/5/2025 5/7/2025 5/15/2025   Spine Treatment   Therapeutic Exercise On Wedge: Posterior pelvic tilts  Towel assisted SKTC  Slouch correction on dynadisc NICHO with thoraci overpressure x 3  Cat/cow exercises into child's pose, quadruped arm raises, quadruped lateral pelvic tilts  On wedge in H/L: posterior pelvic tilts, towel assisted SKTC stretches  H/L LTR stretches ff by anti rotation trunk isometrics against red band 10 reps ea side  Resisted hip flexion exercises  Seated diagonal chops with theraball  Seated on dynadisc: posterior and lateral pelvic tilts  Educated on back mechanics, bending LE's, avoid twisting and she verbalized understanding Gentle stretches : piriformis, prone hip ER/IR, quads stretches  Swiss ball assisted partial DKTC  Partial LTR stretches  Towel assisted SKTC  Supine figure 4 piriformis stretches   Manual Therapy STM to LB, PA mobs to lumbar spine, sacral distractions Gentle Gr 2 PA mobs to thoracic and lumbar spine, gentle side glides     Gentle stm to LS and thoracic areas   Modalities CP to LB at end of visit CP x 5 minutes at end of visit IFC at 9.8 v x 15 minutes with CP in H/L position  Prior to treatment, discussed contraindications and pt denied any  Prior to getting up she performed partial marching and lower trunk rotation for flexibility   Therapeutic Exercise Minutes 15 27 15   Manual Therapy Minutes  15 15   Evaluation Minutes 30     E-Stim (Unattended) Minutes   15   Hot/Cold Pack Minutes 5 5    Total Time Of Timed Procedures 15 42 30   Total Time Of Service-Based Procedures 35 5 15   Total Treatment Time 50 47 45   HEP Access Code: [email or web address]  URL: [email or web address]  Date: 05/05/2025  Prepared by: Vianey Crenshaw    Exercises  - Slouch Overcorrect on Swiss Ball  - 3 x daily - 7 x weekly - 1 sets - 10 reps Access Code: [email or web address]  URL:  [email or web address]  Date: 05/07/2025  Prepared by: Vianey Crenshaw    Exercises  - Cat Cow to Child's Pose  - 1 x daily - 7 x weekly - 1 sets - 10 reps  - Quadruped Alternating Arm Lift  - 1 x daily - 7 x weekly - 1 sets - 10 reps  - Hooklying Isometric Hip Flexion with Opposite Arm  - 1 x daily - 7 x weekly - 1 sets - 10 reps - 5 hold  - Dead Bug  - 1 x daily - 7 x weekly - 1 sets - 10 reps Ice , gentle stretches        HEP  Ice , gentle stretches    Charges  1MT 1TE 1ES

## 2025-05-16 NOTE — TELEPHONE ENCOUNTER
CHILD LIFE PROCEDURE NOTE    Patient Name: Nikolai  Age: 18 month old  Developmental Considerations: Yes: Trach/Vent   Does this patient need an Adaptive Care Plan? Not known  Family/Caregiver Present: Yes, Mother and Father of patient    Services Utilized: not applicable    CCLS transitioned to bedside to provide support during Cath Lab in order to increase positive patient coping.    Situational Information  Location of procedure: Cath Lab  Prior surgery/procedure experience: Yes  Family involvement: Yes: Mother and Father of patient provided developmentally appropriate interaction to and from cath lab with patient and transitioned patient from waiting area to the OR with a good bye ritual (hug, kiss, well wish).     Intervention  Pain management utilized: N/A  Distraction/coping techniques utilized: Light Spinner, I pad with games  Patient response: Introduction of self and child life services to patient and family of patient. Patient appeared to be coping well as evidenced by engaging in developmentally appropriate (light spinner) activity during transition from waiting area to the OR. Patient continued to engage in diversion (light spinner) throughout time in the OR until anesthesia induction was complete. No further needs at this time. Will continue to follow as needed and requested.     Plan: Child life services will remain available to provide support to patient and family as needed throughout healthcare experiences.     Sejal Faustin, MS, CCLS    Called LMOM to call back about menstrual cramping

## 2025-05-19 ENCOUNTER — APPOINTMENT (OUTPATIENT)
Dept: PHYSICAL THERAPY | Age: 25
End: 2025-05-19
Attending: PHYSICIAN ASSISTANT
Payer: COMMERCIAL

## 2025-05-19 ENCOUNTER — OFFICE VISIT (OUTPATIENT)
Dept: PHYSICAL THERAPY | Facility: HOSPITAL | Age: 25
End: 2025-05-19
Attending: PHYSICIAN ASSISTANT
Payer: COMMERCIAL

## 2025-05-19 PROCEDURE — 97140 MANUAL THERAPY 1/> REGIONS: CPT

## 2025-05-19 PROCEDURE — 97110 THERAPEUTIC EXERCISES: CPT

## 2025-05-19 PROCEDURE — 97014 ELECTRIC STIMULATION THERAPY: CPT

## 2025-05-19 NOTE — PROGRESS NOTES
Patient: Zenobia Gregory (24 year old, female) Referring Provider:  Insurance:   Diagnosis: Acute midline low back pain without sciatica Chandan Pulido  [facility]   Date of Surgery: No data recorded Next MD visit:  N/A   Precautions:  None No data recorded Referral Information:    Date of Evaluation: Req: 5, Auth: 5, Exp: 7/31/2025 05/05/25 POC Auth Visits:          Today's Date   5/19/2025    Subjective  LB feels better, treatment last time helped. Stiffness is slowly decreasing and improving       Pain: 3/10     Objective  Moving with minimal guarding. Point ternderness over L4/L5 . Repeated prone extensions decreased the pain to 1/10.            Assessment  Progressing well with gradual decrease in pain level. Pt has improved core activation. She has improved postural awareness and uses proper lifting back mechanics at work.Continue working on set goals    Goals (to be met in 10 visits)      Not Met Progress Toward Partially Met Met   Pt will improve transversus abdominis recruitment to perform proper isometric contraction without requiring verbal or tactile cuing to promote advancement of therex. [] [] [] []   Pt will demonstrate good understanding of proper posture and body mechanics to decrease pain and improve spinal safety. [] [] [] []   Pt will improve lumbar spine AROM flexion to >60 deg to allow increase ease with bending forward to don shoes. [] [] [] []   Pt will  tolerate sitting and driving with pain no greater than 2-3/10 [] [] [] []   Pt will demonstrate improved core strength to be able to perform lifting tasks with <2/10 pain. [] [] [] []   Pt will be independent and compliant with comprehensive HEP to maintain progress achieved in PT [] [] [] []                     Plan  Continue PT per poc    Treatment Last 4 Visits  Treatment Day: 4       5/5/2025 5/7/2025 5/15/2025 5/19/2025   Spine Treatment   Therapeutic Exercise On Wedge: Posterior pelvic tilts  Towel assisted SKTC  Slouch correction on  dynadisc NICHO with thoraci overpressure x 3  Cat/cow exercises into child's pose, quadruped arm raises, quadruped lateral pelvic tilts  On wedge in H/L: posterior pelvic tilts, towel assisted SKTC stretches  H/L LTR stretches ff by anti rotation trunk isometrics against red band 10 reps ea side  Resisted hip flexion exercises  Seated diagonal chops with theraball  Seated on dynadisc: posterior and lateral pelvic tilts  Educated on back mechanics, bending LE's, avoid twisting and she verbalized understanding Gentle stretches : piriformis, prone hip ER/IR, quads stretches  Swiss ball assisted partial DKTC  Partial LTR stretches  Towel assisted SKTC  Supine figure 4 piriformis stretches Prone press ups x 5  Bird dog into child's pose  H/L anti rotation trunk isometrics  Sitting on Swiss ball: posterior pelvic tilt, lateral pelvic tilts, pelvic rotation cw and ccw   Manual Therapy STM to LB, PA mobs to lumbar spine, sacral distractions Gentle Gr 2 PA mobs to thoracic and lumbar spine, gentle side glides     Gentle stm to LS and thoracic areas STM to LS area ff by Gr 2-3 PA mobs of lumbar spine, passive hip ER/IR , quads stretches   Modalities CP to LB at end of visit CP x 5 minutes at end of visit IFC at 9.8 v x 15 minutes with CP in H/L position  Prior to treatment, discussed contraindications and pt denied any  Prior to getting up she performed partial marching and lower trunk rotation for flexibility IFC at 12.5 V x 12 minutes to LB area with CP   Therapeutic Exercise Minutes 15 27 15 15   Manual Therapy Minutes  15 15 12   Evaluation Minutes 30      E-Stim (Unattended) Minutes   15 15   Hot/Cold Pack Minutes 5 5     Total Time Of Timed Procedures 15 42 30 27   Total Time Of Service-Based Procedures 35 5 15 15   Total Treatment Time 50 47 45 42   HEP Access Code: [email or web address]  URL: [email or web address]  Date: 05/05/2025  Prepared by: Vianey Crenshaw    Exercises  - Slouch Overcorrect on Swiss Ball  - 3  x daily - 7 x weekly - 1 sets - 10 reps Access Code: [email or web address]  URL: [email or web address]  Date: 05/07/2025  Prepared by: Vianey Crenshaw    Exercises  - Cat Cow to Child's Pose  - 1 x daily - 7 x weekly - 1 sets - 10 reps  - Quadruped Alternating Arm Lift  - 1 x daily - 7 x weekly - 1 sets - 10 reps  - Hooklying Isometric Hip Flexion with Opposite Arm  - 1 x daily - 7 x weekly - 1 sets - 10 reps - 5 hold  - Dead Bug  - 1 x daily - 7 x weekly - 1 sets - 10 reps Ice , gentle stretches Continue with same exercises        HEP  Continue with same exercises    Charges  1TE 1MT 1ES unattended

## 2025-05-21 ENCOUNTER — APPOINTMENT (OUTPATIENT)
Dept: PHYSICAL THERAPY | Age: 25
End: 2025-05-21
Attending: PHYSICIAN ASSISTANT
Payer: COMMERCIAL

## 2025-05-27 ENCOUNTER — APPOINTMENT (OUTPATIENT)
Dept: PHYSICAL THERAPY | Age: 25
End: 2025-05-27
Attending: PHYSICIAN ASSISTANT
Payer: COMMERCIAL

## 2025-05-30 ENCOUNTER — APPOINTMENT (OUTPATIENT)
Dept: PHYSICAL THERAPY | Age: 25
End: 2025-05-30
Attending: PHYSICIAN ASSISTANT
Payer: COMMERCIAL

## 2025-06-02 ENCOUNTER — OFFICE VISIT (OUTPATIENT)
Dept: PHYSICAL THERAPY | Facility: HOSPITAL | Age: 25
End: 2025-06-02
Attending: PHYSICIAN ASSISTANT
Payer: COMMERCIAL

## 2025-06-02 PROCEDURE — 97014 ELECTRIC STIMULATION THERAPY: CPT

## 2025-06-02 PROCEDURE — 97110 THERAPEUTIC EXERCISES: CPT

## 2025-06-02 NOTE — PROGRESS NOTES
Patient: Zenobia Gregory (24 year old, female) Referring Provider:  Insurance:   Diagnosis: Acute midline low back pain without sciatica Chandan Pulido  [employer] [place] [employer]   Date of Surgery: No data recorded Next MD visit:  N/A   Precautions:  None No data recorded Referral Information:    Date of Evaluation: Req: 5, Auth: 5, Exp: 7/31/2025 05/05/25 POC Auth Visits:          Today's Date   6/2/2025    Subjective  Back feels a ittle better . Low back pain increases in the first 2 hours of work shift and lingers until she can lay down. Her work requires her to stand for prolonged periods of time       Pain: 4/10 (7/10 when at work)     Objective  See flow sheet. Pt needed verbal and tactile cues to activate her core.            Assessment  Pt reports better awareness of posture and core activation. She tolerated progression of core exercises today without any increase in pain. She reported feeling better after treatment. She would benefit from continued PT to further decrease pain and improve core stability.    Goals (to be met in 10 visits)      Not Met Progress Toward Partially Met Met   Pt will improve transversus abdominis recruitment to perform proper isometric contraction without requiring verbal or tactile cuing to promote advancement of therex. [] [x] [] []   Pt will demonstrate good understanding of proper posture and body mechanics to decrease pain and improve spinal safety. [] [x] [] []   Pt will improve lumbar spine AROM flexion to >60 deg to allow increase ease with bending forward to don shoes. [] [] [] []   Pt will  tolerate sitting and driving with pain no greater than 2-3/10 [] [] [] []   Pt will demonstrate improved core strength to be able to perform lifting tasks with <2/10 pain. [] [] [] []   Pt will be independent and compliant with comprehensive HEP to maintain progress achieved in PT [] [x] [] []                         Plan  Continue PT per poc    Treatment Last 4 Visits  Treatment Day: 5        5/7/2025 5/15/2025 5/19/2025 6/2/2025   Spine Treatment   Therapeutic Exercise NICHO with thoraci overpressure x 3  Cat/cow exercises into child's pose, quadruped arm raises, quadruped lateral pelvic tilts  On wedge in H/L: posterior pelvic tilts, towel assisted SKTC stretches  H/L LTR stretches ff by anti rotation trunk isometrics against red band 10 reps ea side  Resisted hip flexion exercises  Seated diagonal chops with theraball  Seated on dynadisc: posterior and lateral pelvic tilts  Educated on back mechanics, bending LE's, avoid twisting and she verbalized understanding Gentle stretches : piriformis, prone hip ER/IR, quads stretches  Swiss ball assisted partial DKTC  Partial LTR stretches  Towel assisted SKTC  Supine figure 4 piriformis stretches Prone press ups x 5  Bird dog into child's pose  H/L anti rotation trunk isometrics  Sitting on Swiss ball: posterior pelvic tilt, lateral pelvic tilts, pelvic rotation cw and ccw Bike x 5 minutes  Forward trunk stretches, reaching towards R and L foot  On dynadisc posterior and lateral pelvic tilts  Standing resisted push/pull using a av  Standing resisted anti rotation side steps R and L  Forward lunges resisted by red band   Monster walk with yellow band   Manual Therapy Gentle Gr 2 PA mobs to thoracic and lumbar spine, gentle side glides     Gentle stm to LS and thoracic areas STM to LS area ff by Gr 2-3 PA mobs of lumbar spine, passive hip ER/IR , quads stretches    Modalities CP x 5 minutes at end of visit IFC at 9.8 v x 15 minutes with CP in H/L position  Prior to treatment, discussed contraindications and pt denied any  Prior to getting up she performed partial marching and lower trunk rotation for flexibility IFC at 12.5 V x 12 minutes to LB area with CP IFC x 15 minutes to LB @ 11.5 v with CP   Therapeutic Exercise Minutes 27 15 15 30   Manual Therapy Minutes 15 15 12    E-Stim (Unattended) Minutes  15 15 15   Hot/Cold Pack Minutes 5      Total Time Of Timed  Procedures 42 30 27 30   Total Time Of Service-Based Procedures 5 15 15 15   Total Treatment Time 47 45 42 45   HEP Access Code: [email or web address]  URL: [email or web address]  Date: 05/07/2025  Prepared by: Vianey Crenshaw    Exercises  - Cat Cow to Child's Pose  - 1 x daily - 7 x weekly - 1 sets - 10 reps  - Quadruped Alternating Arm Lift  - 1 x daily - 7 x weekly - 1 sets - 10 reps  - Hooklying Isometric Hip Flexion with Opposite Arm  - 1 x daily - 7 x weekly - 1 sets - 10 reps - 5 hold  - Dead Bug  - 1 x daily - 7 x weekly - 1 sets - 10 reps Ice , gentle stretches Continue with same exercises Access Code: [email or web address]  URL: [email or web address]  Date: 06/02/2025  Prepared by: Vianey Crenshaw    Exercises  - Anti-Rotation Lateral Stepping with Press  - 1 x daily - 7 x weekly - 1 sets - 10 reps  - Standard Lunge  - 1 x daily - 7 x weekly - 1 sets - 10 reps  Lateral stepping with yellow band        HEP  Access Code: [email or web address]  URL: [email or web address]  Date: 06/02/2025  Prepared by: Vianey Crenshaw    Exercises  - Anti-Rotation Lateral Stepping with Press  - 1 x daily - 7 x weekly - 1 sets - 10 reps  - Standard Lunge  - 1 x daily - 7 x weekly - 1 sets - 10 reps  Lateral stepping with yellow band    Charges  1ES 2TE

## 2025-06-04 ENCOUNTER — TELEPHONE (OUTPATIENT)
Dept: PHYSICAL THERAPY | Facility: HOSPITAL | Age: 25
End: 2025-06-04

## 2025-06-04 ENCOUNTER — APPOINTMENT (OUTPATIENT)
Dept: PHYSICAL THERAPY | Facility: HOSPITAL | Age: 25
End: 2025-06-04
Attending: PHYSICIAN ASSISTANT
Payer: COMMERCIAL

## 2025-06-09 ENCOUNTER — OFFICE VISIT (OUTPATIENT)
Dept: PHYSICAL THERAPY | Facility: HOSPITAL | Age: 25
End: 2025-06-09
Attending: PHYSICIAN ASSISTANT
Payer: COMMERCIAL

## 2025-06-09 PROCEDURE — 97140 MANUAL THERAPY 1/> REGIONS: CPT

## 2025-06-09 PROCEDURE — 97110 THERAPEUTIC EXERCISES: CPT

## 2025-06-09 PROCEDURE — 97014 ELECTRIC STIMULATION THERAPY: CPT

## 2025-06-09 NOTE — PROGRESS NOTES
Patient: Zenobia Gregory (24 year old, female) Referring Provider:  Insurance:   Diagnosis: Acute midline low back pain without sciatica [facility] Pulido  [facility]   Date of Surgery: No data recorded Next MD visit:  N/A   Precautions:  None No data recorded Referral Information:    Date of Evaluation: Req: 10, Auth: 10, Exp: 7/31/2025 05/05/25 POC Auth Visits:          Today's Date   6/9/2025    Subjective  LB pain has now gone down to 3-5 throughout the day but had one episode of 8/10 pain so that she had to leave work No event that caused the pain, just woke up that way       Pain: 3/10     Objective  No limp, demonstrates good sitting posture. Minimal tenderness on LS area, minimal restrictions on lumbar area. Trunk flexion improved to 90 degrees ( was 35 deg at eval) although still with pain and tenderness.            Assessment  Gradual decrease in pain level although still has episodes of severe pain without any provocation. Will see pt for authrized scheduled visits and see how she does at that time.    Goals (to be met in 10 visits)      Not Met Progress Toward Partially Met Met   Pt will improve transversus abdominis recruitment to perform proper isometric contraction without requiring verbal or tactile cuing to promote advancement of therex. [] [x] [] []   Pt will demonstrate good understanding of proper posture and body mechanics to decrease pain and improve spinal safety. [] [x] [] []   Pt will improve lumbar spine AROM flexion to >60 deg to allow increase ease with bending forward to don shoes. [] [] [] []   Pt will  tolerate sitting and driving with pain no greater than 2-3/10 [] [] [] []   Pt will demonstrate improved core strength to be able to perform lifting tasks with <2/10 pain. [] [] [] []   Pt will be independent and compliant with comprehensive HEP to maintain progress achieved in PT [] [x] [] []                             Plan  Continue PT per poc    Treatment Last 4 Visits  Treatment  Day: 6       5/15/2025 5/19/2025 6/2/2025 6/9/2025   Spine Treatment   Therapeutic Exercise Gentle stretches : piriformis, prone hip ER/IR, quads stretches  Swiss ball assisted partial DKTC  Partial LTR stretches  Towel assisted SKTC  Supine figure 4 piriformis stretches Prone press ups x 5  Bird dog into child's pose  H/L anti rotation trunk isometrics  Sitting on Swiss ball: posterior pelvic tilt, lateral pelvic tilts, pelvic rotation cw and ccw Bike x 5 minutes  Forward trunk stretches, reaching towards R and L foot  On dynadisc posterior and lateral pelvic tilts  Standing resisted push/pull using a av  Standing resisted anti rotation side steps R and L  Forward lunges resisted by red band   Monster walk with yellow band Bike x 5 minutes  Standing hamstrings and ITB stretches  Seated forward trunk flexion stretches, reaching for R and L foot  Cat camel, bird dog, modified plank 10 sec x 2  On dynadisc: slouch /correct, lateral pelvic tilts   Manual Therapy Gentle stm to LS and thoracic areas STM to LS area ff by Gr 2-3 PA mobs of lumbar spine, passive hip ER/IR , quads stretches  Gr 2 PA mobs, side glides to lumbar spine   Modalities IFC at 9.8 v x 15 minutes with CP in H/L position  Prior to treatment, discussed contraindications and pt denied any  Prior to getting up she performed partial marching and lower trunk rotation for flexibility IFC at 12.5 V x 12 minutes to LB area with CP IFC x 15 minutes to LB @ 11.5 v with CP IFC x 15 mins to LB with CP at 11.5 v x 15 minutes   Therapeutic Exercise Minutes 15 15 30 20   Manual Therapy Minutes 15 12  10   E-Stim (Unattended) Minutes 15 15 15 15   Total Time Of Timed Procedures 30 27 30 30   Total Time Of Service-Based Procedures 15 15 15 15   Total Treatment Time 45 42 45 45   HEP Ice , gentle stretches Continue with same exercises Access Code: [email or web address]  URL: [email or web address]  Date: 06/02/2025  Prepared by: Vianey Crenshaw    Exercises  -  Anti-Rotation Lateral Stepping with Press  - 1 x daily - 7 x weekly - 1 sets - 10 reps  - Standard Lunge  - 1 x daily - 7 x weekly - 1 sets - 10 reps  Lateral stepping with yellow band Continue same exercises        HEP  Continue same exercises    Charges  1TE 1MT, 1ES

## 2025-06-11 ENCOUNTER — OFFICE VISIT (OUTPATIENT)
Dept: PHYSICAL THERAPY | Facility: HOSPITAL | Age: 25
End: 2025-06-11
Attending: PHYSICIAN ASSISTANT
Payer: COMMERCIAL

## 2025-06-11 PROCEDURE — 97110 THERAPEUTIC EXERCISES: CPT

## 2025-06-11 PROCEDURE — 97140 MANUAL THERAPY 1/> REGIONS: CPT

## 2025-06-11 PROCEDURE — 97014 ELECTRIC STIMULATION THERAPY: CPT

## 2025-06-11 NOTE — PROGRESS NOTES
Patient: Zenobia Gregory (24 year old, female) Referring Provider:  Insurance:   Diagnosis: Acute midline low back pain without sciatica Chandan Pulido  [facility]   Date of Surgery: No data recorded Next MD visit:  N/A   Precautions:  None No data recorded Referral Information:    Date of Evaluation: Req: 10, Auth: 10, Exp: 7/31/2025 05/05/25 POC Auth Visits:          Today's Date   6/11/2025    Subjective  Pt has been off work and LB pain has been relatively low. No numbness/tingling. Feels more mobile       Pain: 3/10     Objective  See flow sheet            Assessment  At end of visit pt reported pain went down to 1/10. She is demonstrating consistent progress with a gradual decrease in LB pain and improved trunk mobility. Continue working towards set goals.    Goals (to be met in 10 visits)      Not Met Progress Toward Partially Met Met   Pt will improve transversus abdominis recruitment to perform proper isometric contraction without requiring verbal or tactile cuing to promote advancement of therex. [] [x] [] []   Pt will demonstrate good understanding of proper posture and body mechanics to decrease pain and improve spinal safety. [] [x] [] []   Pt will improve lumbar spine AROM flexion to >60 deg to allow increase ease with bending forward to don shoes. [] [] [] []   Pt will  tolerate sitting and driving with pain no greater than 2-3/10 [] [] [] []   Pt will demonstrate improved core strength to be able to perform lifting tasks with <2/10 pain. [] [] [] []   Pt will be independent and compliant with comprehensive HEP to maintain progress achieved in PT [] [x] [] []                                 Plan  Continue PT per poc    Treatment Last 4 Visits  Treatment Day: 7       5/19/2025 6/2/2025 6/9/2025 6/11/2025   Spine Treatment   Therapeutic Exercise Prone press ups x 5  Bird dog into child's pose  H/L anti rotation trunk isometrics  Sitting on Swiss ball: posterior pelvic tilt, lateral pelvic tilts,  pelvic rotation cw and ccw Bike x 5 minutes  Forward trunk stretches, reaching towards R and L foot  On dynadisc posterior and lateral pelvic tilts  Standing resisted push/pull using a av  Standing resisted anti rotation side steps R and L  Forward lunges resisted by red band   Monster walk with yellow band Bike x 5 minutes  Standing hamstrings and ITB stretches  Seated forward trunk flexion stretches, reaching for R and L foot  Cat camel, bird dog, modified plank 10 sec x 2  On dynadisc: slouch /correct, lateral pelvic tilts Bike x 5 minutes  Trunk stretches into flex and diagonals with Swiss ball  Seated on Swiss ball: partial roll up lateral pelvics, pelvic rotation cw and ccw, marching without UE support   Manual Therapy STM to LS area ff by Gr 2-3 PA mobs of lumbar spine, passive hip ER/IR , quads stretches  Gr 2 PA mobs, side glides to lumbar spine Muscle energy technique to correct asymmetric ASIS and LLD  STM to LB    Modalities IFC at 12.5 V x 12 minutes to LB area with CP IFC x 15 minutes to LB @ 11.5 v with CP IFC x 15 mins to LB with CP at 11.5 v x 15 minutes IFC and CP to LB x 15 minutes at 9.6 v   Therapeutic Exercise Minutes 15 30 20 15   Manual Therapy Minutes 12  10 15   E-Stim (Unattended) Minutes 15 15 15 15   Total Time Of Timed Procedures 27 30 30 30   Total Time Of Service-Based Procedures 15 15 15 15   Total Treatment Time 42 45 45 45   HEP Continue with same exercises Access Code: [email or web address]  URL: [email or web address]  Date: 06/02/2025  Prepared by: Vianey Crenshaw    Exercises  - Anti-Rotation Lateral Stepping with Press  - 1 x daily - 7 x weekly - 1 sets - 10 reps  - Standard Lunge  - 1 x daily - 7 x weekly - 1 sets - 10 reps  Lateral stepping with yellow band Continue same exercises Do bridges today 2 sets of 5 , at least 2 separate times  May continue with other exercises as tolerated   Use pain as guide        HEP  Do bridges today 2 sets of 5 , at least 2 separate  times  May continue with other exercises as tolerated   Use pain as guide    Charges  1TE 1MT 1ES

## 2025-06-19 NOTE — TELEPHONE ENCOUNTER
Requested Prescriptions     Pending Prescriptions Disp Refills    JENCYCLA 0.35 MG Oral Tab [Pharmacy Med Name: JENCYCLA 0.35 MG TABLET] 84 tablet 1     Sig: TAKE 1 TABLET BY MOUTH EVERY DAY        Last refill: 1/7/25 84 tabs 1 refill    Last Appointment: LOV 3/27/2025     Next Appointment: Visit date not found

## 2025-06-20 RX ORDER — NORETHINDRONE 0.35 MG/1
0.35 TABLET ORAL DAILY
Qty: 84 TABLET | Refills: 1 | Status: SHIPPED | OUTPATIENT
Start: 2025-06-20

## 2025-06-26 ENCOUNTER — OFFICE VISIT (OUTPATIENT)
Dept: PHYSICAL THERAPY | Facility: HOSPITAL | Age: 25
End: 2025-06-26
Attending: PHYSICIAN ASSISTANT
Payer: COMMERCIAL

## 2025-06-26 PROCEDURE — 97110 THERAPEUTIC EXERCISES: CPT

## 2025-06-26 PROCEDURE — 97014 ELECTRIC STIMULATION THERAPY: CPT

## 2025-06-26 PROCEDURE — 97140 MANUAL THERAPY 1/> REGIONS: CPT

## 2025-06-26 NOTE — PROGRESS NOTES
Patient: Zenobia Gregory (24 year old, female) Referring Provider:  Insurance:   Diagnosis: Acute midline low back pain without sciatica Chandan Pulido  [employer] [place]   Date of Surgery: No data recorded Next MD visit:  N/A   Precautions:  None No data recorded Referral Information:    Date of Evaluation: Req: 10, Auth: 10, Exp: 7/31/2025 05/05/25 POC Auth Visits:          Today's Date   6/26/2025    Subjective  Pt still have episodes of severe pain levels about 2-3x/week. One episode of pain going down 1 leg.       Pain: 2/10     Objective  Minimal tenderness , moderate muscle spasm , more on the R side. ASIS and leg length are symmetric.       See flow sheet     Assessment  Pt still reporting episodes of severe pain. She is scheduled to see MD for follow up and will ask about other treatment options. PT helps control pain for at least 2 days . She would benefit from continued PT to further stabilize/relieve LB pain.at end of visit pt reported less  pain.    Goals (to be met in 10 visits)      Not Met Progress Toward Partially Met Met   Pt will improve transversus abdominis recruitment to perform proper isometric contraction without requiring verbal or tactile cuing to promote advancement of therex. [] [x] [] []   Pt will demonstrate good understanding of proper posture and body mechanics to decrease pain and improve spinal safety. [] [x] [] []   Pt will improve lumbar spine AROM flexion to >60 deg to allow increase ease with bending forward to don shoes. [] [] [] []   Pt will  tolerate sitting and driving with pain no greater than 2-3/10 [] [] [] []   Pt will demonstrate improved core strength to be able to perform lifting tasks with <2/10 pain. [] [] [] []   Pt will be independent and compliant with comprehensive HEP to maintain progress achieved in PT [] [x] [] []                                     Plan  Continue PT per poc.    Treatment Last 4 Visits  Treatment Day: 8       6/2/2025 6/9/2025 6/11/2025 6/26/2025    Spine Treatment   Therapeutic Exercise Bike x 5 minutes  Forward trunk stretches, reaching towards R and L foot  On dynadisc posterior and lateral pelvic tilts  Standing resisted push/pull using a av  Standing resisted anti rotation side steps R and L  Forward lunges resisted by red band   Monster walk with yellow band Bike x 5 minutes  Standing hamstrings and ITB stretches  Seated forward trunk flexion stretches, reaching for R and L foot  Cat camel, bird dog, modified plank 10 sec x 2  On dynadisc: slouch /correct, lateral pelvic tilts Bike x 5 minutes  Trunk stretches into flex and diagonals with Swiss ball  Seated on Swiss ball: partial roll up lateral pelvics, pelvic rotation cw and ccw, marching without UE support Bike x 5 mins  Prone press ups 2 sets of 5  Bird dog, H/L anti rotation trunk isometrics against blue band  Extended leg bridge with added hip ER/IR   Manual Therapy  Gr 2 PA mobs, side glides to lumbar spine Muscle energy technique to correct asymmetric ASIS and LLD  STM to LB  Gr 2 PA mobs to lumbar spine, STM to LS mm  R hip flexor stretch over edge of mat with STM to hip flexors and quads   Modalities IFC x 15 minutes to LB @ 11.5 v with CP IFC x 15 mins to LB with CP at 11.5 v x 15 minutes IFC and CP to LB x 15 minutes at 9.6 v IFC x 10 mins at 9 v x 15 mins to LB area with CP   Therapeutic Exercise Minutes 30 20 15 15   Manual Therapy Minutes  10 15 15   E-Stim (Unattended) Minutes 15 15 15 15   Total Time Of Timed Procedures 30 30 30 30   Total Time Of Service-Based Procedures 15 15 15 15   Total Treatment Time 45 45 45 45   HEP Access Code: [email or web address]  URL: [email or web address]  Date: 06/02/2025  Prepared by: Vianey Crenshaw    Exercises  - Anti-Rotation Lateral Stepping with Press  - 1 x daily - 7 x weekly - 1 sets - 10 reps  - Standard Lunge  - 1 x daily - 7 x weekly - 1 sets - 10 reps  Lateral stepping with yellow band Continue same exercises Do bridges today 2 sets of  5 , at least 2 separate times  May continue with other exercises as tolerated   Use pain as guide Continue with same exercises        HEP  Continue with same exercises    Charges  1TE 1MT 1 ES

## 2025-07-09 ENCOUNTER — OFFICE VISIT (OUTPATIENT)
Dept: FAMILY MEDICINE CLINIC | Facility: CLINIC | Age: 25
End: 2025-07-09
Payer: COMMERCIAL

## 2025-07-09 VITALS
SYSTOLIC BLOOD PRESSURE: 114 MMHG | HEIGHT: 64.5 IN | HEART RATE: 93 BPM | DIASTOLIC BLOOD PRESSURE: 66 MMHG | WEIGHT: 131 LBS | OXYGEN SATURATION: 98 % | BODY MASS INDEX: 22.09 KG/M2 | TEMPERATURE: 98 F

## 2025-07-09 DIAGNOSIS — M54.50 ACUTE MIDLINE LOW BACK PAIN WITHOUT SCIATICA: Primary | ICD-10-CM

## 2025-07-09 PROCEDURE — 3074F SYST BP LT 130 MM HG: CPT | Performed by: PHYSICIAN ASSISTANT

## 2025-07-09 PROCEDURE — 99213 OFFICE O/P EST LOW 20 MIN: CPT | Performed by: PHYSICIAN ASSISTANT

## 2025-07-09 PROCEDURE — 3008F BODY MASS INDEX DOCD: CPT | Performed by: PHYSICIAN ASSISTANT

## 2025-07-09 PROCEDURE — 3078F DIAST BP <80 MM HG: CPT | Performed by: PHYSICIAN ASSISTANT

## 2025-07-09 NOTE — PROGRESS NOTES
The following individual(s) verbally consented to be recorded using ambient AI listening technology and understand that they can each withdraw their consent to this listening technology at any point by asking the clinician to turn off or pause the recording:    Patient name: Zenobia Arcemary jane  Additional names:

## 2025-07-11 NOTE — PROGRESS NOTES
Subjective:   Zenobia Gregory is a 24 year old female who presents for Pain (Back pain continues.   Today doesn't feel bad at all but sometimes is worse. Has been going to PT and wants to know if any other testing needs to be done)       History/Other:   History of Present Illness  Zenobia Gregory is a 24 year old female who presents with back pain.    She has been experiencing sharp, localized back pain for about a month, which worsens when sitting and improves when standing or lying down. The pain is exacerbated by sneezing. She recalls a low back injury involving a disc around the age of [age]. She has a history of ballet practice and was previously treated for hip tendonitis.    A lumbar x-ray previously showed minimal narrowing at L3, L4, mild postural changes, and tiny old lumbus vertebrae involving the inferior anterior L4 vertebral body, with no scoliosis and mild shortening of lumbar lordosis. Physical therapy was initiated, which has helped, but she still experiences intermittent severe pain. She reports that her physical therapist told her her core strength was weak and her posture was tilted forward. She continues to do ab workouts and stretches at home.    She works at [employer], where her job involves frequent bending and carrying, which she feels contributes to her back pain. She works long shifts, often leading to increased pain after about three days of work. The pain can start in her lower back and move to her right hip, although the hip pain is not constant. She usually wakes up with pain, which persists throughout the day. Her bed is older, and she wears flat shoes, which may lack support.    No persistent numbness, tingling, or other joint issues, except for occasional ankle pain after work. No fevers, chills, or sweats. She has had four days off recently, which has helped alleviate her symptoms.     Chief Complaint Reviewed and Verified  Nursing Notes Reviewed and   Verified  Tobacco Reviewed   Allergies Reviewed  Medications Reviewed    Social History Reviewed         Tobacco:  She has never smoked tobacco.    Current Medications[1]      Objective:   /66 (BP Location: Right arm, Patient Position: Sitting, Cuff Size: adult)   Pulse 93   Temp 98.2 °F (36.8 °C) (Oral)   Ht 5' 4.5\" (1.638 m)   Wt 131 lb (59.4 kg)   LMP 03/24/2025 (Exact Date)   SpO2 98%   BMI 22.14 kg/m²  Estimated body mass index is 22.14 kg/m² as calculated from the following:    Height as of this encounter: 5' 4.5\" (1.638 m).    Weight as of this encounter: 131 lb (59.4 kg).  Results  RADIOLOGY  Lumbar spine x-ray: Minimal narrowing at L3, L4; mild postural changes; tiny old Schmorl's nodes involving inferior anterior L4 vertebral body; no scoliosis; mild loss of lumbar lordosis (03/27/2025)     Physical Exam  GENERAL: Alert, cooperative, well developed, no acute distress.  EXTREMITIES: No cyanosis or edema.  MUSCULOSKELETAL: No midline tenderness on palpation of the spine, no tenderness on palpation of the sacroiliac joint.        Assessment & Plan:   There are no diagnoses linked to this encounter.  Assessment & Plan  Acute midline low back pain without sciatica  Intermittent sharp localized back pain, exacerbated by prolonged bending and lifting at work. Physical therapy provides some relief, but pain persists, especially after consecutive workdays. Pain is mechanical, influenced by footwear and mattress quality. No red flag symptoms such as persistent numbness, tingling, or systemic symptoms. Lumbar x-ray shows minimal narrowing at L3, L4, and mild postural changes, with no significant abnormalities. Pain likely due to mechanical strain and posture-related issues. Discussed benefits of supportive footwear, mattress adjustments, and work environment modifications. Emphasized monitoring for worsening symptoms and maintaining physical therapy exercises.  - Continue physical therapy exercises at home.  - Consider using a  back brace during long work shifts.  - Purchase supportive shoes and consider using shoe inserts.  - Evaluate mattress quality and consider using a mattress pad or rotating the mattress.  - Discuss with employer potential modifications to work environment to reduce bending and lifting.      SHEILA Huynh, 7/11/2025, 10:39 AM             [1]   Current Outpatient Medications   Medication Sig Dispense Refill    JENCYCLA 0.35 MG Oral Tab TAKE 1 TABLET BY MOUTH EVERY DAY 84 tablet 1    FLUoxetine 10 MG Oral Cap Take 1 capsule (10 mg total) by mouth daily. 90 capsule 3

## 2025-07-17 ENCOUNTER — APPOINTMENT (OUTPATIENT)
Dept: PHYSICAL THERAPY | Facility: HOSPITAL | Age: 25
End: 2025-07-17
Attending: PHYSICIAN ASSISTANT

## 2025-08-16 ENCOUNTER — OFFICE VISIT (OUTPATIENT)
Dept: FAMILY MEDICINE CLINIC | Facility: CLINIC | Age: 25
End: 2025-08-16

## 2025-08-16 VITALS
DIASTOLIC BLOOD PRESSURE: 68 MMHG | WEIGHT: 131 LBS | BODY MASS INDEX: 22.09 KG/M2 | HEIGHT: 64.5 IN | TEMPERATURE: 98 F | OXYGEN SATURATION: 98 % | RESPIRATION RATE: 16 BRPM | SYSTOLIC BLOOD PRESSURE: 102 MMHG | HEART RATE: 73 BPM

## 2025-08-16 DIAGNOSIS — H61.23 BILATERAL IMPACTED CERUMEN: Primary | ICD-10-CM

## 2025-08-16 PROCEDURE — 3074F SYST BP LT 130 MM HG: CPT

## 2025-08-16 PROCEDURE — 3008F BODY MASS INDEX DOCD: CPT

## 2025-08-16 PROCEDURE — 69209 REMOVE IMPACTED EAR WAX UNI: CPT

## 2025-08-16 PROCEDURE — 3078F DIAST BP <80 MM HG: CPT

## (undated) DEVICE — CAPSULE BRAVO PH

## (undated) DEVICE — 1200CC GUARDIAN II: Brand: GUARDIAN

## (undated) DEVICE — ENDOSCOPY PACK UPPER: Brand: MEDLINE INDUSTRIES, INC.

## (undated) DEVICE — CANNULA NASAL CO2 PEDS

## (undated) DEVICE — FORCEP BIOPSY RJ4 LG CAP W/ND

## (undated) DEVICE — Device: Brand: DEFENDO AIR/WATER/SUCTION AND BIOPSY VALVE

## (undated) DEVICE — 3M™ RED DOT™ MONITORING ELECTRODE WITH FOAM TAPE AND STICKY GEL, 50/BAG, 20/CASE, 72/PLT 2570: Brand: RED DOT™

## (undated) NOTE — LETTER
Date & Time: 5/21/2021, 7:28 PM  Patient: Tran Cobb  Encounter Provider(s):    Vipin Perez MD       To Whom It May Concern:    Tran Cobb was seen and treated in our department on 5/21/2021.  She can return to work with these limitations:

## (undated) NOTE — LETTER
[facility]  Dwight Judd 62 [id number]    Consent for Operation    Date: __________________    Time: _______________    1.  I authorize the performance upon [facility] the following operation:    Procedure(s):  ESOPHAGOGASTRODUODEN procedure has been videotaped, the surgeon will obtain the original videotape. The hospital will not be responsible for storage or maintenance of this tape.     6. For the purpose of advancing medical education, I consent to the admittance of observers to t STATEMENTS REQUIRING INSERTION OR COMPLETION WERE FILLED IN.     Signature of Patient:   ___________________________    When the patient is a minor or mentally incompetent to give consent:  Signature of person authorized to consent for patient: ____________ supplements, and pills I can buy without a prescription (including street drugs/illegal medications). Failure to inform my anesthesiologist about these medicines may increase my risk of anesthetic complications.   · If I am allergic to anything or have had Anesthesiologist Signature     Date   Time  I have discussed the procedure and information above with the patient (or patient’s representative) and answered their questions. The patient or their representative has agreed to have anesthesia services.     ___

## (undated) NOTE — LETTER
Date: 2/27/2020    Patient Name: Vargas Richards          To Whom it may concern: This letter has been written at the patient's request. The above patient was seen at the [facility] for treatment of a medical condition.     This patient gilbert

## (undated) NOTE — LETTER
Date: 8/19/2022    Patient Name: Angelina Parikh          To Whom it may concern:    I recommend that [facility] is allowed to use [facility] unit in her dorm room.       Sincerely,        Aviva Magaña, DO

## (undated) NOTE — LETTER
[facility] [employer] of Child Health Examination       Student's Name  Rico Juan Birth D Signature                                                                                                                                              Title                           Date    (If adding dates to the above immunization history section, put y ALLERGIES  (Food, drug, insect, other)  Patient has no known allergies.  MEDICATION  (List all prescribed or taken on a regular basis.)    Current Outpatient Medications:   •  Dextromethorphan-guaiFENesin (DELSYM COUGH/CHEST CONGEST DM OR), Take by mouth., PHYSICAL EXAMINATION REQUIREMENTS (head circumference if <33 years old):   BP 98/56   Pulse 72   Temp 98.6 °F (37 °C) (Oral)   Resp 16   Ht 64.5\"   Wt 109 lb 6.4 oz   LMP 06/07/2019 (Exact Date)   BMI 18.49 kg/m²     DIABETES SCREENING  BMI>85% age/sex Cardiovascular/HTN Yes  Nutritional status Yes    Respiratory Yes                   Diagnosis of Asthma: No Mental Health Yes        Currently Prescribed Asthma Medication:            Quick-relief  medication (e.g. Short Acting Beta Antagonist):  No

## (undated) NOTE — LETTER
Date: 10/22/2019    Patient Name: Mery Mckeon          To Whom it may concern: This letter has been written at the patient's request. The above patient was seen at the [facility] for treatment of a medical condition.     This patient sh

## (undated) NOTE — LETTER
Alyse Taylor MD        I acknowledge that I have been informed of the risk involved by refusing the urine pregnancy on [facility].     I, thereby, r

## (undated) NOTE — LETTER
Date: 1/7/2020    Patient Name: Geovanna Harry          To Whom it may concern: This letter has been written at the patient's request. The above patient was seen at the [facility] for treatment of a medical condition.     This patient gilbertu